# Patient Record
Sex: MALE | Race: WHITE | Employment: FULL TIME | URBAN - METROPOLITAN AREA
[De-identification: names, ages, dates, MRNs, and addresses within clinical notes are randomized per-mention and may not be internally consistent; named-entity substitution may affect disease eponyms.]

---

## 2020-10-12 ENCOUNTER — APPOINTMENT (OUTPATIENT)
Dept: GENERAL RADIOLOGY | Age: 30
End: 2020-10-12
Payer: COMMERCIAL

## 2020-10-12 ENCOUNTER — APPOINTMENT (OUTPATIENT)
Dept: CT IMAGING | Age: 30
End: 2020-10-12
Payer: COMMERCIAL

## 2020-10-12 ENCOUNTER — HOSPITAL ENCOUNTER (EMERGENCY)
Age: 30
Discharge: HOME | End: 2020-10-13
Payer: COMMERCIAL

## 2020-10-12 PROCEDURE — 73090 X-RAY EXAM OF FOREARM: CPT

## 2020-10-12 PROCEDURE — 73030 X-RAY EXAM OF SHOULDER: CPT

## 2020-10-12 PROCEDURE — 73080 X-RAY EXAM OF ELBOW: CPT

## 2020-10-12 PROCEDURE — 6370000000 HC RX 637 (ALT 250 FOR IP): Performed by: PHYSICIAN ASSISTANT

## 2020-10-12 PROCEDURE — 99284 EMERGENCY DEPT VISIT MOD MDM: CPT

## 2020-10-12 PROCEDURE — 70450 CT HEAD/BRAIN W/O DYE: CPT

## 2020-10-12 PROCEDURE — 72125 CT NECK SPINE W/O DYE: CPT

## 2020-10-12 RX ORDER — ACETAMINOPHEN 500 MG
1000 TABLET ORAL ONCE
Status: COMPLETED | OUTPATIENT
Start: 2020-10-12 | End: 2020-10-12

## 2020-10-12 RX ORDER — LISINOPRIL 20 MG/1
20 TABLET ORAL DAILY
COMMUNITY

## 2020-10-12 RX ADMIN — ACETAMINOPHEN 1000 MG: 500 TABLET ORAL at 23:01

## 2020-10-12 ASSESSMENT — PAIN SCALES - GENERAL
PAINLEVEL_OUTOF10: 7

## 2020-10-12 ASSESSMENT — PAIN DESCRIPTION - ORIENTATION: ORIENTATION: RIGHT

## 2020-10-12 ASSESSMENT — PAIN DESCRIPTION - PAIN TYPE
TYPE: ACUTE PAIN
TYPE: ACUTE PAIN

## 2020-10-12 ASSESSMENT — PAIN DESCRIPTION - LOCATION: LOCATION: ARM

## 2020-10-13 VITALS
HEIGHT: 67 IN | DIASTOLIC BLOOD PRESSURE: 96 MMHG | OXYGEN SATURATION: 97 % | TEMPERATURE: 97.9 F | BODY MASS INDEX: 39.24 KG/M2 | SYSTOLIC BLOOD PRESSURE: 134 MMHG | RESPIRATION RATE: 20 BRPM | WEIGHT: 250 LBS | HEART RATE: 84 BPM

## 2020-10-13 RX ORDER — ACETAMINOPHEN 500 MG
1000 TABLET ORAL EVERY 6 HOURS PRN
Qty: 40 TABLET | Refills: 0 | Status: SHIPPED | OUTPATIENT
Start: 2020-10-13 | End: 2020-10-18

## 2020-10-13 RX ORDER — IBUPROFEN 400 MG/1
400 TABLET ORAL EVERY 6 HOURS PRN
Qty: 20 TABLET | Refills: 0 | Status: SHIPPED | OUTPATIENT
Start: 2020-10-13

## 2020-10-13 NOTE — ED NOTES
Patient identified as a positive fall risk on the ED triage fall screening. Patient placed in fall precautions which includes:  yellow fall risk bracelet on wrist, personal non skid shoes on feet, \"Be Safe\" sign placed on patient's door, and bed alarm placed under patient/alarm turned on. Patient instructed on importance of not getting out of bed or ambulating without assistance for safety.           Moe Aguayo RN  10/12/20 2627

## 2020-10-13 NOTE — ED PROVIDER NOTES
[address] [facility]  ED  EMERGENCY DEPARTMENT ENCOUNTER        Pt Name: Quita Sanford  MRN: [id number]  [place] 1990  Date of evaluation: 10/12/2020  Provider: ESTER Ceballos  PCP: No primary care provider on file. JETT. I have evaluated this patient. My supervising physician was available for consultation. CHIEF COMPLAINT       Chief Complaint   Patient presents with    Arm Pain     Patient fell down approximately 6 steps today from slipping on wet leaves and landed on right arm. Pain in right arm. Patient states he had no LOC but his head on the right side. HISTORY OF PRESENT ILLNESS   (Location, Timing/Onset, Context/Setting, Quality, Duration, Modifying Factors, Severity, Associated Signs and Symptoms)  Note limiting factors. Quita Sanford is a 26 y.o. male presents the emergency department for right arm pain. Patient reports that he was walking down very slippery steps in the rain, fell down 6 steps landing on his right elbow and upper arm. Notes that he hit his head on the ground but denies loss of conscious. Notes a mild headache. Notes a tingling sensation in his elbow into his fingers. Denies numbness, weakness, vision changes, nausea, vomiting, chest pain, abdominal pain, back pain, lower extremity pain. Nursing Notes were all reviewed and agreed with or any disagreements were addressed in the HPI. REVIEW OF SYSTEMS    (2-9 systems for level 4, 10 or more for level 5)     Review of Systems    Positives and Pertinent negatives as per HPI. Except as noted above in the ROS, all other systems were reviewed and negative. PAST MEDICAL HISTORY     Past Medical History:   Diagnosis Date    Asthma     Hypertension          SURGICAL HISTORY   History reviewed. No pertinent surgical history.       Saige Emery 95       Discharge Medication List as of 10/13/2020 12:05 AM      CONTINUE these medications which have NOT CHANGED    Details lisinopril (PRINIVIL;ZESTRIL) 20 MG tablet Take 20 mg by mouth dailyHistorical Med      oxyCODONE-acetaminophen (PERCOCET) 5-325 MG per tablet Take 1 tablet by mouth every 8 hours as needed for Pain., Disp-15 tablet, R-0               ALLERGIES     Patient has no known allergies. FAMILYHISTORY       Family History   Problem Relation Age of Onset    Asthma Father     Heart Disease Father     High Blood Pressure Father     High Cholesterol Father     Asthma Brother           SOCIAL HISTORY       Social History     Tobacco Use    Smoking status: Current Every Day Smoker     Packs/day: 1.00     Types: Cigarettes    Smokeless tobacco: Never Used   Substance Use Topics    Alcohol use: No    Drug use: Not on file       SCREENINGS    Marcelina Coma Scale  Eye Opening: Spontaneous  Best Verbal Response: Oriented  Best Motor Response: Obeys commands  Frazee Coma Scale Score: 15        PHYSICAL EXAM    (up to 7 for level 4, 8 or more for level 5)     ED Triage Vitals [10/12/20 2227]   BP Temp Temp Source Pulse Resp SpO2 Height Weight   (!) 155/92 98.6 °F (37 °C) Oral 100 16 95 % 5' 7\" (1.702 m) 250 lb (113.4 kg)       Physical Exam  Vitals signs reviewed. Constitutional:       Appearance: He is not diaphoretic. HENT:      Head: Normocephalic and atraumatic. Nose: No congestion or rhinorrhea. Eyes:      General: No scleral icterus. Extraocular Movements: Extraocular movements intact. Conjunctiva/sclera: Conjunctivae normal.      Pupils: Pupils are equal, round, and reactive to light. Neck:      Musculoskeletal: Normal range of motion and neck supple. Muscular tenderness present. No neck rigidity. Cardiovascular:      Rate and Rhythm: Normal rate and regular rhythm. Pulses: Normal pulses. Heart sounds: Normal heart sounds. No murmur. No friction rub. No gallop. Pulmonary:      Effort: Pulmonary effort is normal. No respiratory distress. Breath sounds: Normal breath sounds. fracture. CT Head WO Contrast   Final Result   Negative noncontrast CT examination of the brain. CT Cervical Spine WO Contrast   Final Result   No acute abnormality of the cervical spine. Xr Shoulder Right (min 2 Views)    Result Date: 10/12/2020  EXAMINATION: THREE XRAY VIEWS OF THE RIGHT SHOULDER; THREE XRAY VIEWS OF THE RIGHT ELBOW; TWO XRAY VIEWS OF THE RIGHT FOREARM 10/12/2020 11:16 pm COMPARISON: None. HISTORY: ORDERING SYSTEM PROVIDED HISTORY: Fall TECHNOLOGIST PROVIDED HISTORY: Reason for exam:->Fall Reason for Exam: Slipped on some leaves and fell, landed on right side FINDINGS: Right shoulder: No acute fracture or dislocation is identified. Right elbow: No acute fracture or dislocation or effusion is identified. Right forearm: No fracture or dislocation is identified. No radiographic evidence of fracture. Xr Elbow Right (min 3 Views)    Result Date: 10/12/2020  EXAMINATION: THREE XRAY VIEWS OF THE RIGHT SHOULDER; THREE XRAY VIEWS OF THE RIGHT ELBOW; TWO XRAY VIEWS OF THE RIGHT FOREARM 10/12/2020 11:16 pm COMPARISON: None. HISTORY: ORDERING SYSTEM PROVIDED HISTORY: Fall TECHNOLOGIST PROVIDED HISTORY: Reason for exam:->Fall Reason for Exam: Slipped on some leaves and fell, landed on right side FINDINGS: Right shoulder: No acute fracture or dislocation is identified. Right elbow: No acute fracture or dislocation or effusion is identified. Right forearm: No fracture or dislocation is identified. No radiographic evidence of fracture. Xr Radius Ulna Right (2 Views)    Result Date: 10/12/2020  EXAMINATION: THREE XRAY VIEWS OF THE RIGHT SHOULDER; THREE XRAY VIEWS OF THE RIGHT ELBOW; TWO XRAY VIEWS OF THE RIGHT FOREARM 10/12/2020 11:16 pm COMPARISON: None.  HISTORY: ORDERING SYSTEM PROVIDED HISTORY: Fall TECHNOLOGIST PROVIDED HISTORY: Reason for exam:->Fall Reason for Exam: Slipped on some leaves and fell, landed on right side FINDINGS: Right shoulder: No acute fracture or head, no LOC FINDINGS: BONES/ALIGNMENT: There is no acute fracture or traumatic malalignment. DEGENERATIVE CHANGES: There is prominent posterior endplate spurring at E2-M6. SOFT TISSUES: There is no prevertebral soft tissue swelling. No acute abnormality of the cervical spine. PROCEDURES   Unless otherwise noted below, none     Procedures    CRITICAL CARE TIME   N/A    CONSULTS:  None      EMERGENCY DEPARTMENT COURSE and DIFFERENTIAL DIAGNOSIS/MDM:   Vitals:    Vitals:    10/12/20 2227 10/12/20 2332 10/13/20 0024   BP: (!) 155/92 134/77 (!) 134/96   Pulse: 100 98 84   Resp: 16 17 20   Temp: 98.6 °F (37 °C)  97.9 °F (36.6 °C)   TempSrc: Oral  Oral   SpO2: 95% 98% 97%   Weight: 250 lb (113.4 kg)     Height: 5' 7\" (1.702 m)         Patient was given the following medications:  Medications   acetaminophen (TYLENOL) tablet 1,000 mg (1,000 mg Oral Given 10/12/20 2301)           42-year-old male presents emergency room from home for right arm pain after fall. Due to his tingling sensation of his arm, I did elect to order CT scan of his head, cervical spine and x-rays of his right shoulder, right elbow, and right forearm. Imaging studies without evidence of fracture, intracranial bleeding, cervical fracture or dislocation. Neurovascular intact, low concern for emergent injury at this time. Appropriate discharge with outpatient follow-up. Given prescription for Tylenol, ibuprofen. Given information for [facility] pre-services, recommend to call tomorrow to help to schedule primary care follow-up, ideally to be seen within 1 week. Instructed to return the emergency room for new or worsening symptoms including but not limited to numbness, weakness, severe headache, vision change, severe nausea or vomiting, chest pain, shortness of breath, any other symptoms he is concerned about. Verbal and written discharge instructions and return precautions given. FINAL IMPRESSION      1. Right arm pain    2.

## 2020-10-13 NOTE — ED NOTES
Patient being taken for xray at this time via stretcher.         Belkis Delgadillo RN  10/12/20 7203

## 2021-01-23 ENCOUNTER — APPOINTMENT (OUTPATIENT)
Dept: GENERAL RADIOLOGY | Age: 31
DRG: 872 | End: 2021-01-23
Payer: COMMERCIAL

## 2021-01-23 ENCOUNTER — HOSPITAL ENCOUNTER (INPATIENT)
Age: 31
LOS: 1 days | Discharge: HOME | DRG: 872 | End: 2021-01-25
Attending: EMERGENCY MEDICINE | Admitting: INTERNAL MEDICINE
Payer: COMMERCIAL

## 2021-01-23 DIAGNOSIS — A41.9 SEPTICEMIA (HCC): ICD-10-CM

## 2021-01-23 DIAGNOSIS — L03.116 CELLULITIS OF LEFT LOWER EXTREMITY: ICD-10-CM

## 2021-01-23 DIAGNOSIS — E87.1 HYPONATREMIA: Primary | ICD-10-CM

## 2021-01-23 LAB
A/G RATIO: 1.5 (ref 1.1–2.2)
ALBUMIN SERPL-MCNC: 4.1 G/DL (ref 3.4–5)
ALP BLD-CCNC: 67 U/L (ref 40–129)
ALT SERPL-CCNC: 29 U/L (ref 10–40)
ANION GAP SERPL CALCULATED.3IONS-SCNC: 8 MMOL/L (ref 3–16)
AST SERPL-CCNC: 21 U/L (ref 15–37)
BILIRUB SERPL-MCNC: 0.5 MG/DL (ref 0–1)
BUN BLDV-MCNC: 7 MG/DL (ref 7–20)
CALCIUM SERPL-MCNC: 9.1 MG/DL (ref 8.3–10.6)
CHLORIDE BLD-SCNC: 97 MMOL/L (ref 99–110)
CO2: 23 MMOL/L (ref 21–32)
CREAT SERPL-MCNC: 0.8 MG/DL (ref 0.9–1.3)
GFR AFRICAN AMERICAN: >60
GFR NON-AFRICAN AMERICAN: >60
GLOBULIN: 2.7 G/DL
GLUCOSE BLD-MCNC: 178 MG/DL (ref 70–99)
INR BLD: 1.05 (ref 0.86–1.14)
LACTIC ACID: 1.3 MMOL/L (ref 0.4–2)
POTASSIUM SERPL-SCNC: 3.5 MMOL/L (ref 3.5–5.1)
PROTHROMBIN TIME: 12.2 SEC (ref 10–13.2)
SODIUM BLD-SCNC: 128 MMOL/L (ref 136–145)
TOTAL PROTEIN: 6.8 G/DL (ref 6.4–8.2)

## 2021-01-23 PROCEDURE — 93005 ELECTROCARDIOGRAM TRACING: CPT | Performed by: PHYSICIAN ASSISTANT

## 2021-01-23 PROCEDURE — 6360000002 HC RX W HCPCS: Performed by: PHYSICIAN ASSISTANT

## 2021-01-23 PROCEDURE — 6370000000 HC RX 637 (ALT 250 FOR IP): Performed by: PHYSICIAN ASSISTANT

## 2021-01-23 PROCEDURE — 84145 PROCALCITONIN (PCT): CPT

## 2021-01-23 PROCEDURE — 99285 EMERGENCY DEPT VISIT HI MDM: CPT

## 2021-01-23 PROCEDURE — 83605 ASSAY OF LACTIC ACID: CPT

## 2021-01-23 PROCEDURE — 80053 COMPREHEN METABOLIC PANEL: CPT

## 2021-01-23 PROCEDURE — U0002 COVID-19 LAB TEST NON-CDC: HCPCS

## 2021-01-23 PROCEDURE — 87040 BLOOD CULTURE FOR BACTERIA: CPT

## 2021-01-23 PROCEDURE — 2580000003 HC RX 258: Performed by: PHYSICIAN ASSISTANT

## 2021-01-23 PROCEDURE — 81001 URINALYSIS AUTO W/SCOPE: CPT

## 2021-01-23 PROCEDURE — 36415 COLL VENOUS BLD VENIPUNCTURE: CPT

## 2021-01-23 PROCEDURE — 71045 X-RAY EXAM CHEST 1 VIEW: CPT

## 2021-01-23 PROCEDURE — 96365 THER/PROPH/DIAG IV INF INIT: CPT

## 2021-01-23 PROCEDURE — 85610 PROTHROMBIN TIME: CPT

## 2021-01-23 PROCEDURE — 85025 COMPLETE CBC W/AUTO DIFF WBC: CPT

## 2021-01-23 RX ORDER — ACETAMINOPHEN 500 MG
1000 TABLET ORAL ONCE
Status: COMPLETED | OUTPATIENT
Start: 2021-01-23 | End: 2021-01-23

## 2021-01-23 RX ORDER — SODIUM CHLORIDE, SODIUM LACTATE, POTASSIUM CHLORIDE, CALCIUM CHLORIDE 600; 310; 30; 20 MG/100ML; MG/100ML; MG/100ML; MG/100ML
30 INJECTION, SOLUTION INTRAVENOUS ONCE
Status: COMPLETED | OUTPATIENT
Start: 2021-01-23 | End: 2021-01-24

## 2021-01-23 RX ORDER — SODIUM CHLORIDE, SODIUM LACTATE, POTASSIUM CHLORIDE, CALCIUM CHLORIDE 600; 310; 30; 20 MG/100ML; MG/100ML; MG/100ML; MG/100ML
1000 INJECTION, SOLUTION INTRAVENOUS ONCE
Status: DISCONTINUED | OUTPATIENT
Start: 2021-01-23 | End: 2021-01-23

## 2021-01-23 RX ADMIN — SODIUM CHLORIDE, POTASSIUM CHLORIDE, SODIUM LACTATE AND CALCIUM CHLORIDE 1914 ML: 600; 310; 30; 20 INJECTION, SOLUTION INTRAVENOUS at 23:17

## 2021-01-23 RX ADMIN — PIPERACILLIN SODIUM AND TAZOBACTAM SODIUM 4500 MG: 4; .5 INJECTION, POWDER, LYOPHILIZED, FOR SOLUTION INTRAVENOUS at 23:30

## 2021-01-23 RX ADMIN — ACETAMINOPHEN 1000 MG: 500 TABLET ORAL at 23:17

## 2021-01-23 ASSESSMENT — PAIN SCALES - GENERAL: PAINLEVEL_OUTOF10: 7

## 2021-01-23 ASSESSMENT — PAIN DESCRIPTION - PAIN TYPE: TYPE: ACUTE PAIN

## 2021-01-24 ENCOUNTER — APPOINTMENT (OUTPATIENT)
Dept: CT IMAGING | Age: 31
DRG: 872 | End: 2021-01-24
Payer: COMMERCIAL

## 2021-01-24 PROBLEM — L03.90 CELLULITIS: Status: ACTIVE | Noted: 2021-01-24

## 2021-01-24 LAB
BACTERIA: ABNORMAL /HPF
BANDED NEUTROPHILS RELATIVE PERCENT: 10 % (ref 0–7)
BASOPHILS ABSOLUTE: 0 K/UL (ref 0–0.2)
BASOPHILS RELATIVE PERCENT: 0 %
BILIRUBIN URINE: NEGATIVE
BLOOD, URINE: ABNORMAL
CLARITY: CLEAR
COLOR: YELLOW
EKG ATRIAL RATE: 105 BPM
EKG DIAGNOSIS: NORMAL
EKG P AXIS: 42 DEGREES
EKG P-R INTERVAL: 140 MS
EKG Q-T INTERVAL: 314 MS
EKG QRS DURATION: 94 MS
EKG QTC CALCULATION (BAZETT): 415 MS
EKG R AXIS: -6 DEGREES
EKG T AXIS: 30 DEGREES
EKG VENTRICULAR RATE: 105 BPM
EOSINOPHILS ABSOLUTE: 0.2 K/UL (ref 0–0.6)
EOSINOPHILS RELATIVE PERCENT: 1 %
EPITHELIAL CELLS, UA: ABNORMAL /HPF (ref 0–5)
GLUCOSE URINE: NEGATIVE MG/DL
HCT VFR BLD CALC: 38.2 % (ref 40.5–52.5)
HEMOGLOBIN: 12.9 G/DL (ref 13.5–17.5)
KETONES, URINE: NEGATIVE MG/DL
LEUKOCYTE ESTERASE, URINE: NEGATIVE
LYMPHOCYTES ABSOLUTE: 1.3 K/UL (ref 1–5.1)
LYMPHOCYTES RELATIVE PERCENT: 8 %
MCH RBC QN AUTO: 28.7 PG (ref 26–34)
MCHC RBC AUTO-ENTMCNC: 33.8 G/DL (ref 31–36)
MCV RBC AUTO: 85 FL (ref 80–100)
MICROSCOPIC EXAMINATION: YES
MONOCYTES ABSOLUTE: 1.5 K/UL (ref 0–1.3)
MONOCYTES RELATIVE PERCENT: 9 %
MUCUS: ABNORMAL /LPF
NEUTROPHILS ABSOLUTE: 13.6 K/UL (ref 1.7–7.7)
NEUTROPHILS RELATIVE PERCENT: 72 %
NITRITE, URINE: NEGATIVE
PDW BLD-RTO: 13.1 % (ref 12.4–15.4)
PH UA: 6.5 (ref 5–8)
PLATELET # BLD: 266 K/UL (ref 135–450)
PMV BLD AUTO: 7.5 FL (ref 5–10.5)
PROCALCITONIN: 0.08 NG/ML (ref 0–0.15)
PROTEIN UA: ABNORMAL MG/DL
RBC # BLD: 4.5 M/UL (ref 4.2–5.9)
RBC # BLD: NORMAL 10*6/UL
RBC UA: ABNORMAL /HPF (ref 0–4)
SARS-COV-2, NAAT: NOT DETECTED
SPECIFIC GRAVITY UA: 1.02 (ref 1–1.03)
URINE REFLEX TO CULTURE: ABNORMAL
URINE TYPE: ABNORMAL
UROBILINOGEN, URINE: 1 E.U./DL
WBC # BLD: 16.6 K/UL (ref 4–11)
WBC UA: ABNORMAL /HPF (ref 0–5)

## 2021-01-24 PROCEDURE — 6370000000 HC RX 637 (ALT 250 FOR IP): Performed by: INTERNAL MEDICINE

## 2021-01-24 PROCEDURE — 6360000002 HC RX W HCPCS: Performed by: INTERNAL MEDICINE

## 2021-01-24 PROCEDURE — 2580000003 HC RX 258: Performed by: INTERNAL MEDICINE

## 2021-01-24 PROCEDURE — 6360000004 HC RX CONTRAST MEDICATION: Performed by: PHYSICIAN ASSISTANT

## 2021-01-24 PROCEDURE — 93010 ELECTROCARDIOGRAM REPORT: CPT | Performed by: INTERNAL MEDICINE

## 2021-01-24 PROCEDURE — 99253 IP/OBS CNSLTJ NEW/EST LOW 45: CPT | Performed by: SURGERY

## 2021-01-24 PROCEDURE — 2580000003 HC RX 258: Performed by: PHYSICIAN ASSISTANT

## 2021-01-24 PROCEDURE — 96367 TX/PROPH/DG ADDL SEQ IV INF: CPT

## 2021-01-24 PROCEDURE — 74177 CT ABD & PELVIS W/CONTRAST: CPT

## 2021-01-24 PROCEDURE — 2060000000 HC ICU INTERMEDIATE R&B

## 2021-01-24 PROCEDURE — 6360000002 HC RX W HCPCS: Performed by: PHYSICIAN ASSISTANT

## 2021-01-24 RX ORDER — SODIUM CHLORIDE 9 MG/ML
1000 INJECTION, SOLUTION INTRAVENOUS ONCE
Status: COMPLETED | OUTPATIENT
Start: 2021-01-24 | End: 2021-01-24

## 2021-01-24 RX ORDER — SODIUM CHLORIDE 0.9 % (FLUSH) 0.9 %
10 SYRINGE (ML) INJECTION PRN
Status: DISCONTINUED | OUTPATIENT
Start: 2021-01-24 | End: 2021-01-25 | Stop reason: HOSPADM

## 2021-01-24 RX ORDER — LISINOPRIL 20 MG/1
20 TABLET ORAL DAILY
Status: DISCONTINUED | OUTPATIENT
Start: 2021-01-24 | End: 2021-01-25 | Stop reason: HOSPADM

## 2021-01-24 RX ORDER — ONDANSETRON 2 MG/ML
4 INJECTION INTRAMUSCULAR; INTRAVENOUS EVERY 6 HOURS PRN
Status: DISCONTINUED | OUTPATIENT
Start: 2021-01-24 | End: 2021-01-25 | Stop reason: HOSPADM

## 2021-01-24 RX ORDER — PROMETHAZINE HYDROCHLORIDE 25 MG/1
12.5 TABLET ORAL EVERY 6 HOURS PRN
Status: DISCONTINUED | OUTPATIENT
Start: 2021-01-24 | End: 2021-01-25 | Stop reason: HOSPADM

## 2021-01-24 RX ORDER — ACETAMINOPHEN 325 MG/1
650 TABLET ORAL EVERY 6 HOURS PRN
Status: DISCONTINUED | OUTPATIENT
Start: 2021-01-24 | End: 2021-01-25 | Stop reason: HOSPADM

## 2021-01-24 RX ORDER — MAGNESIUM SULFATE IN WATER 40 MG/ML
2000 INJECTION, SOLUTION INTRAVENOUS PRN
Status: DISCONTINUED | OUTPATIENT
Start: 2021-01-24 | End: 2021-01-25

## 2021-01-24 RX ORDER — POTASSIUM CHLORIDE 7.45 MG/ML
10 INJECTION INTRAVENOUS PRN
Status: DISCONTINUED | OUTPATIENT
Start: 2021-01-24 | End: 2021-01-25

## 2021-01-24 RX ORDER — FAMOTIDINE 20 MG/1
20 TABLET, FILM COATED ORAL DAILY PRN
Status: DISCONTINUED | OUTPATIENT
Start: 2021-01-24 | End: 2021-01-25 | Stop reason: HOSPADM

## 2021-01-24 RX ORDER — SODIUM CHLORIDE 0.9 % (FLUSH) 0.9 %
10 SYRINGE (ML) INJECTION EVERY 12 HOURS SCHEDULED
Status: DISCONTINUED | OUTPATIENT
Start: 2021-01-24 | End: 2021-01-25 | Stop reason: HOSPADM

## 2021-01-24 RX ORDER — ACETAMINOPHEN 650 MG/1
650 SUPPOSITORY RECTAL EVERY 6 HOURS PRN
Status: DISCONTINUED | OUTPATIENT
Start: 2021-01-24 | End: 2021-01-25 | Stop reason: HOSPADM

## 2021-01-24 RX ADMIN — ACETAMINOPHEN 650 MG: 325 TABLET ORAL at 20:34

## 2021-01-24 RX ADMIN — Medication 10 ML: at 20:34

## 2021-01-24 RX ADMIN — Medication 1500 MG: at 12:08

## 2021-01-24 RX ADMIN — SODIUM CHLORIDE 1000 ML: 9 INJECTION, SOLUTION INTRAVENOUS at 04:08

## 2021-01-24 RX ADMIN — CEFTRIAXONE SODIUM 2000 MG: 2 INJECTION, POWDER, FOR SOLUTION INTRAMUSCULAR; INTRAVENOUS at 08:48

## 2021-01-24 RX ADMIN — LISINOPRIL 20 MG: 20 TABLET ORAL at 10:27

## 2021-01-24 RX ADMIN — Medication 10 ML: at 08:50

## 2021-01-24 RX ADMIN — VANCOMYCIN HYDROCHLORIDE 2000 MG: 1 INJECTION, POWDER, LYOPHILIZED, FOR SOLUTION INTRAVENOUS at 00:05

## 2021-01-24 RX ADMIN — IOPAMIDOL 75 ML: 755 INJECTION, SOLUTION INTRAVENOUS at 00:41

## 2021-01-24 RX ADMIN — ACETAMINOPHEN 650 MG: 325 TABLET ORAL at 09:38

## 2021-01-24 ASSESSMENT — PAIN SCALES - GENERAL
PAINLEVEL_OUTOF10: 3
PAINLEVEL_OUTOF10: 0

## 2021-01-24 ASSESSMENT — PAIN DESCRIPTION - LOCATION
LOCATION: LEG
LOCATION: LEG

## 2021-01-24 ASSESSMENT — PAIN DESCRIPTION - PAIN TYPE: TYPE: ACUTE PAIN

## 2021-01-24 ASSESSMENT — PAIN DESCRIPTION - ORIENTATION
ORIENTATION: MID
ORIENTATION: MID

## 2021-01-24 NOTE — CONSULTS
Department of General Surgery Consult    PATIENT NAME: Gloria Joseph   YOB: 1990    ADMISSION DATE: 1/23/2021 10:34 PM      TODAY'S DATE: 1/24/2021    Reason for Consult: Left groin abscess    Chief Complaint: Left groin abscess    Requesting Physician: Bouchra    HISTORY OF PRESENT ILLNESS:              The patient is a 26 y.o. male who presents with left groin abscess. He states this actually started a couple weeks ago. It got progressively worse over the course of the past day or 2. Yesterday, he stated he took warm baths to try to get it to settle down. At that point, after getting out of the bath, it ruptured and drained. He subsequently developed fevers and chills and his blood pressure went up. He states he feels better today, but it is still sore. Past Medical History:        Diagnosis Date    Asthma     Hyperlipidemia     Hypertension        Past Surgical History:    History reviewed. No pertinent surgical history.     Current Medications:   Current Facility-Administered Medications: sodium chloride flush 0.9 % injection 10 mL, 10 mL, Intravenous, 2 times per day  sodium chloride flush 0.9 % injection 10 mL, 10 mL, Intravenous, PRN  potassium chloride 10 mEq/100 mL IVPB (Peripheral Line), 10 mEq, Intravenous, PRN  magnesium sulfate 2000 mg in 50 mL IVPB premix, 2,000 mg, Intravenous, PRN  promethazine (PHENERGAN) tablet 12.5 mg, 12.5 mg, Oral, Q6H PRN **OR** ondansetron (ZOFRAN) injection 4 mg, 4 mg, Intravenous, Q6H PRN  famotidine (PEPCID) tablet 20 mg, 20 mg, Oral, Daily PRN  acetaminophen (TYLENOL) tablet 650 mg, 650 mg, Oral, Q6H PRN **OR** acetaminophen (TYLENOL) suppository 650 mg, 650 mg, Rectal, Q6H PRN  cefTRIAXone (ROCEPHIN) 2000 mg IVPB in D5W 50ml minibag, 2,000 mg, Intravenous, Q24H  vancomycin 1500 mg in dextrose 5% 300 mL IVPB, 1,500 mg, Intravenous, Q12H  Prior to Admission medications Medication Sig Start Date End Date Taking? Authorizing Provider   ibuprofen (ADVIL;MOTRIN) 400 MG tablet Take 1 tablet by mouth every 6 hours as needed for Pain 10/13/20  Yes ESTER Ramírez   lisinopril (PRINIVIL;ZESTRIL) 20 MG tablet Take 20 mg by mouth daily   Yes Historical Provider, MD   acetaminophen (TYLENOL) 500 MG tablet Take 2 tablets by mouth every 6 hours as needed for Pain 10/13/20 10/18/20  ESTER Ramírez   oxyCODONE-acetaminophen (PERCOCET) 5-325 MG per tablet Take 1 tablet by mouth every 8 hours as needed for Pain. 6/23/14   ESTER Prieto        Allergies:  Patient has no known allergies. Social History:   TOBACCO:   reports that he has been smoking cigarettes. He has been smoking about 1.00 pack per day. He has never used smokeless tobacco.  ETOH:   reports no history of alcohol use. DRUGS:   reports previous drug use. Family History:        Problem Relation Age of Onset    Asthma Father     Heart Disease Father     High Blood Pressure Father     High Cholesterol Father     Asthma Brother        REVIEW OF SYSTEMS:  CONSTITUTIONAL:  positive for  fevers and chills  HEENT:  negative  RESPIRATORY:  negative  CARDIOVASCULAR:  negative  GASTROINTESTINAL:  negative  GENITOURINARY:  negative  HEMATOLOGIC/LYMPHATIC:  negative  NEUROLOGICAL:  Negative  * All other ROS reviewed and negative. PHYSICAL EXAM:  VITALS:  /81   Pulse 102   Temp 98.7 °F (37.1 °C) (Oral)   Resp 16   Ht 5' 7\" (1.702 m)   Wt 264 lb 8 oz (120 kg)   SpO2 98%   BMI 41.43 kg/m²   24HR INTAKE/OUTPUT:    No intake/output data recorded.   I/O this shift:  In: -   Out: 800 [Urine:800]    CONSTITUTIONAL:  alert, no apparent distress and morbidly obese  EYES:  PERRL, sclera clear  ENT:  Normocephalic,atraumatic, without obvious abnormality  NECK:  supple, symmetrical, trachea midline  LUNGS: Resp effort easy and unlabored, clear to auscultation CARDIOVASCULAR:  NO JVD, tachycardic with regular rhythm and no murmur noted  ABDOMEN:  normal bowel sounds, soft, non-distended, non-tender  MUSCULOSKELETAL: No clubbing or cyanosis, 0+ pitting edema lower extremities  NEUROLOGIC:  Mental Status Exam:  Level of Alertness:   awake  PSYCHIATRIC:   person, place, time  SKIN: On his left medial thigh, there is an area of induration with mild cellulitis. There is an opening over the prior abscess cavity that is draining some viscous bloody fluid. There is no obvious persistent fluid collection on examination and probing of the wound    DATA:    CBC:   Recent Labs     01/23/21 2230   WBC 16.6*   HGB 12.9*   HCT 38.2*        BMP:    Recent Labs     01/23/21 2230   *   K 3.5   CL 97*   CO2 23   BUN 7   CREATININE 0.8*   GLUCOSE 178*     Hepatic:   Recent Labs     01/23/21 2230   AST 21   ALT 29   BILITOT 0.5   ALKPHOS 79     Mag:    No results for input(s): MG in the last 72 hours. Phos:   No results for input(s): PHOS in the last 72 hours. INR:   Recent Labs     01/23/21  2250   INR 1.05       Radiology Review: Images personally reviewed by me. Left inguinal fat stranding extending into the medial left upper thigh with   no evidence of a localized fluid collection/abscess.  Findings are consistent   with cellulitis. Jacqui Zbigniew is no soft tissue gas to suggest necrotizing   fasciitis. No acute finding in the abdomen or pelvis.  Fatty infiltration of the liver. Small bilateral fat containing inguinal hernias. IMPRESSION/RECOMMENDATIONS:    Left medial thigh abscess with cellulitis. The abscess has apparently drained spontaneously. No indication at this time for surgical intervention. Continue IV antibiotics and supportive care.     Electronically signed by Ivonne Brody MD     [address] [facility]  [id number]

## 2021-01-24 NOTE — ED NOTES
Bed: 11  Expected date: 1/23/21  Expected time:   Means of arrival:   Comments:  Med [address] [place] [address]  01/23/21 0850

## 2021-01-24 NOTE — PROGRESS NOTES
Patient admitted to room 217 from ED. Tele box 115 in place cmu notified. Patient oriented to room, call light, bed rails, phone, lights and bathroom. Patient instructed about the schedule of the day including: vital sign frequency, lab draws, possible tests, frequency of MD and staff rounds, including RN/MD rounding together at bedside, daily weights, and I &O's. Patient instructed about prescribed diet, how to use 8MENU, and television. Bed locked, in lowest position, side rails up 2/4, call light within reach. Will continue to monitor. Disaster documentation initiated at this time.

## 2021-01-24 NOTE — H&P
Hospital Medicine History & Physical      PCP: No primary care provider on file. Date of Admission: 1/23/2021    Date of Service: Pt seen/examined on 1/24/21 and Admitted to Inpatient with expected LOS greater than two midnights due to medical therapy. Chief Complaint:  Groin cyst      History Of Present Illness:      26 y.o. male who presented to [facility] with developing upper left thigh pain and cyst. Pt noted lower abd pain 1.5 weeks ago but in the past 3 days, had dramatically worsened. He noted cyst on inner left thigh and popped open yesterday, with blood drainage. He then noted worsening swelling along with fever of 101 and chills. His bp was 150s/80s at home with 120 pulse rate so came in for evaluation. Past Medical History:          Diagnosis Date    Asthma     Hyperlipidemia     Hypertension        Past Surgical History:      History reviewed. No pertinent surgical history. Medications Prior to Admission:      Prior to Admission medications    Medication Sig Start Date End Date Taking? Authorizing Provider   ibuprofen (ADVIL;MOTRIN) 400 MG tablet Take 1 tablet by mouth every 6 hours as needed for Pain 10/13/20  Yes ESTER Valentino   lisinopril (PRINIVIL;ZESTRIL) 20 MG tablet Take 20 mg by mouth daily   Yes Historical Provider, MD   acetaminophen (TYLENOL) 500 MG tablet Take 2 tablets by mouth every 6 hours as needed for Pain 10/13/20 10/18/20  ESTER Valentino   oxyCODONE-acetaminophen (PERCOCET) 5-325 MG per tablet Take 1 tablet by mouth every 8 hours as needed for Pain. 6/23/14   ESTER Gutierrez       Allergies:  Patient has no known allergies. Social History:      The patient currently lives at home    TOBACCO:   reports that he has been smoking cigarettes. He has been smoking about 1.00 pack per day. He has never used smokeless tobacco.  ETOH:   reports no history of alcohol use.   E-Cigarettes/Vaping Use     Questions Responses E-Cigarette/Vaping Use     Start Date     Passive Exposure     Quit Date     Counseling Given     Comments             Family History:       Reviewed in detail and negative for DM, CAD, Cancer, CVA. Positive as follows:        Problem Relation Age of Onset    Asthma Father     Heart Disease Father     High Blood Pressure Father     High Cholesterol Father     Asthma Brother        REVIEW OF SYSTEMS:   Pertinent positives as noted in the HPI. All other systems reviewed and negative. PHYSICAL EXAM PERFORMED:    /81   Pulse 102   Temp 98.7 °F (37.1 °C) (Oral)   Resp 16   Ht 5' 7\" (1.702 m)   Wt 264 lb 8 oz (120 kg)   SpO2 98%   BMI 41.43 kg/m²     General appearance:  No apparent distress, appears stated age and cooperative. HEENT:  Normal cephalic, atraumatic without obvious deformity. Pupils equal, round, and reactive to light. Extra ocular muscles intact. Conjunctivae/corneas clear. Neck: Supple, with full range of motion. No jugular venous distention. Trachea midline. Respiratory:  Normal respiratory effort. Clear to auscultation, bilaterally without Rales/Wheezes/Rhonchi. Cardiovascular:  Regular rate and rhythm with normal S1/S2 without murmurs, rubs or gallops. Abdomen: Soft, non-tender, non-distended with normal bowel sounds. Musculoskeletal:  No clubbing, cyanosis or edema bilaterally. Full range of motion without deformity. Skin: Skin color, texture, turgor normal.  No rashes or lesions. Demarcated area of erythema and induration on inner upper medial left thigh with dressing covering cyst  Neurologic:  Neurovascularly intact without any focal sensory/motor deficits.  Cranial nerves: II-XII intact, grossly non-focal.  Psychiatric:  Alert and oriented, thought content appropriate, normal insight  Capillary Refill: Brisk,< 3 seconds   Peripheral Pulses: +2 palpable, equal bilaterally       Labs:     Recent Labs     01/23/21  2230   WBC 16.6*   HGB 12.9*   HCT 38.2*    Recent Labs     01/23/21  2230   *   K 3.5   CL 97*   CO2 23   BUN 7   CREATININE 0.8*   CALCIUM 9.1     Recent Labs     01/23/21  2230   AST 21   ALT 29   BILITOT 0.5   ALKPHOS 67     Recent Labs     01/23/21  2250   INR 1.05     No results for input(s): Priscilla Mirman in the last 72 hours. Urinalysis:      Lab Results   Component Value Date    NITRU Negative 01/23/2021    WBCUA 3-5 01/23/2021    BACTERIA Rare 01/23/2021    RBCUA 5-10 01/23/2021    BLOODU MODERATE 01/23/2021    SPECGRAV 1.025 01/23/2021    GLUCOSEU Negative 01/23/2021       Radiology:     EKG:  I have reviewed the EKG with the following interpretation: sinus tach, 105, nl axis, no ischemia appreciated    CT ABDOMEN PELVIS W IV CONTRAST Additional Contrast? None   Final Result   Left inguinal fat stranding extending into the medial left upper thigh with   no evidence of a localized fluid collection/abscess. Findings are consistent   with cellulitis. There is no soft tissue gas to suggest necrotizing   fasciitis. No acute finding in the abdomen or pelvis. Fatty infiltration of the liver. Small bilateral fat containing inguinal hernias. XR CHEST PORTABLE   Final Result   Stable chest with no acute abnormality seen.              ASSESSMENT:    Active Hospital Problems    Diagnosis Date Noted    Cellulitis [L03.90] 01/24/2021         PLAN:    Early sepsis- POA, with fever, tachycardia, leukocytosis and skin source, CT w/o obvious abscess however pt noted drainage, nl  Lactate, rapid covid negative  -gen surg consulted   -started on iv vanc/rocephin on 1/24, adjust as needed  -bcx obtained    HTN-stable, on acei    Hyponatremia- mild, 128 on admission, suspect volume depletion  -ivfs given, monitored labs Obesity -  With Body mass index is 41.43 kg/m². Complicating assessment and treatment. Placing patient at risk for multiple co-morbidities as well as early death and contributing to the patient's presentation. Counseled on weight loss. DVT Prophylaxis: lovenox  Diet: DIET GENERAL;  Code Status: Full Code    PT/OT Eval Status: not needed    Dispo - pending improvement, ?by Rodolfo Hunter MD    Thank you No primary care provider on file. for the opportunity to be involved in this patient's care. If you have any questions or concerns please feel free to contact me at [phone].

## 2021-01-24 NOTE — PROGRESS NOTES
Changed Mepilex foam that MD had placed on patient's upper inner thigh. Dressing saturated with serosanguinous fluid. Patient tolerated well. Swelling still within limits of marked area.   TANVI CRAWLEY

## 2021-01-24 NOTE — CONSULTS
Pharmacy to Dose Vancomycin    Dx: [facility]  Goal trough = 10-15  Pt wt = 113.4 kg  Recent Labs     01/23/21 2230   CREATININE 0.8*     Recent Labs     01/23/21 2230   WBC 16.6*     Vanc 2g given in ED. Start Vanc 1500 mg IV q12h. Vancomycin trough: 1/25 1100.   Ane Dress 1/24/2021 4:32 AM

## 2021-01-24 NOTE — ED PROVIDER NOTES
[facility]  EMERGENCY DEPARTMENT ENCOUNTER        Pt Name: Ashlee Lewis  MRN: [id number]  Len[place] 1990  Date of evaluation: 1/23/2021  Provider: Khari Askew PA-C  PCP: No primary care provider on file. I have seen and evaluated this patient with my supervising physician Naldo Gagnon, [facility]       Chief Complaint   Patient presents with    Abdominal Pain     patient reports that abdominal pain at noon, reports that took ibuprofen at 1700 reports had a fever of 101. patient reports pain is 7 on 1-10 scale to mid abdomen    Dysuria     patient reports painful urination that started 45 minutes ago     Cyst     patient reports left groin cyst, swelling that busted open at 1030 after a bath that bled, reports that still bleeding and swelling        HISTORY OF PRESENT ILLNESS   (Location, Timing/Onset, Context/Setting, Quality, Duration, Modifying Factors, Severity, Associated Signs and Symptoms)  Note limiting factors. Ashlee Lewis is a 26 y.o. male patient presenting with complaint infection left groin/perineal/buttock area. Onset 2 weeks ago. Worse over the past 72 hours. This morning 11:30 AM he took a hot bath as a guide the bath it open to drain he continues draining a foul-smelling brownish/blackish substance/purulence. Indicates he spiked a fever earlier today. He took ibuprofen 800 mg at 8 PM.  He has had no Tylenol. Patient noted blood pressure bit elevated. He is on lisinopril 20 mg. He typically takes this in the evening. He took 20 mg at 10 PM this evening. Indicates chills but no rigors. He indicates no shortness of breath or chest pain. No nausea, vomiting or diarrhea. Patient does report having had a pilonidal cyst/abscess 2013. Patient also indicating some dysuria occurring over the past 45 minutes. States hot in character. I will check a UA. Nursing Notes were all reviewed and agreed with or any disagreements were addressed in the HPI. REVIEW OF SYSTEMS    (2-9 systems for level 4, 10 or more for level 5)     Review of Systems    Positives and Pertinent negatives as per HPI. Except as noted above in the ROS, all other systems were reviewed and negative. PAST MEDICAL HISTORY     Past Medical History:   Diagnosis Date    Asthma     Hyperlipidemia     Hypertension          SURGICAL HISTORY   History reviewed. No pertinent surgical history. [address]       Current Discharge Medication List      CONTINUE these medications which have NOT CHANGED    Details   ibuprofen (ADVIL;MOTRIN) 400 MG tablet Take 1 tablet by mouth every 6 hours as needed for Pain  Qty: 20 tablet, Refills: 0      lisinopril (PRINIVIL;ZESTRIL) 20 MG tablet Take 20 mg by mouth daily      acetaminophen (TYLENOL) 500 MG tablet Take 2 tablets by mouth every 6 hours as needed for Pain  Qty: 40 tablet, Refills: 0      oxyCODONE-acetaminophen (PERCOCET) 5-325 MG per tablet Take 1 tablet by mouth every 8 hours as needed for Pain. Qty: 15 tablet, Refills: 0               ALLERGIES     Patient has no known allergies.     FAMILYHISTORY       Family History   Problem Relation Age of Onset    Asthma Father     Heart Disease Father     High Blood Pressure Father     High Cholesterol Father     Asthma Brother           SOCIAL HISTORY       Social History     Tobacco Use    Smoking status: Current Every Day Smoker     Packs/day: 1.00     Types: Cigarettes    Smokeless tobacco: Never Used   Substance Use Topics    Alcohol use: No    Drug use: Not Currently       SCREENINGS    Marcelina Coma Scale  Eye Opening: Spontaneous  Best Verbal Response: Oriented  Best Motor Response: Obeys commands  Marcelina Coma Scale Score: 15        PHYSICAL EXAM    (up to 7 for level 4, 8 or more for level 5)     ED Triage Vitals   BP Temp Temp Source Pulse Resp SpO2 Height Weight 01/23/21 2237 01/23/21 2237 01/23/21 2237 01/23/21 2237 01/23/21 2237 01/23/21 2237 01/23/21 2235 01/23/21 2235   (!) 144/73 98.6 °F (37 °C) Oral 114 16 97 % 5' 6\" (1.676 m) 250 lb (113.4 kg)       Physical Exam  Vitals signs and nursing note reviewed. Constitutional:       Appearance: Normal appearance. He is well-developed. He is obese. HENT:      Head: Normocephalic and atraumatic. Right Ear: External ear normal.      Left Ear: External ear normal.   Eyes:      General: No scleral icterus. Right eye: No discharge. Left eye: No discharge. Conjunctiva/sclera: Conjunctivae normal.   Neck:      Musculoskeletal: Normal range of motion and neck supple. Cardiovascular:      Rate and Rhythm: Regular rhythm. Tachycardia present. Heart sounds: Normal heart sounds. Pulmonary:      Effort: Pulmonary effort is normal.      Breath sounds: Normal breath sounds. Abdominal:      General: Abdomen is flat. Bowel sounds are normal.   Musculoskeletal: Normal range of motion. Comments: Patient has on the perineal area left side induration, erythema and drainage. It extends anteriorly. He has groin tenderness and adenopathy noted. He has no genital involvement. Skin:     General: Skin is warm and dry. Neurological:      General: No focal deficit present. Mental Status: He is alert and oriented to person, place, and time. Mental status is at baseline. Psychiatric:         Mood and Affect: Mood normal.         Behavior: Behavior normal.         Thought Content:  Thought content normal.         Judgment: Judgment normal.         DIAGNOSTIC RESULTS   LABS:    Labs Reviewed   CBC WITH AUTO DIFFERENTIAL - Abnormal; Notable for the following components:       Result Value    WBC 16.6 (*)     Hemoglobin 12.9 (*)     Hematocrit 38.2 (*)     Neutrophils Absolute 13.6 (*)     Monocytes Absolute 1.5 (*)     Bands Relative 10 (*)     All other components within normal limits    Narrative: Performed at:  [place] [address],  [place], [facility]   Phone [phone]   COMPREHENSIVE METABOLIC PANEL - Abnormal; Notable for the following components:    Sodium 128 (*)     Chloride 97 (*)     Glucose 178 (*)     CREATININE 0.8 (*)     All other components within normal limits    Narrative:     Performed at:  [address],  [place], [facility]   Phone [phone]   URINE RT REFLEX TO CULTURE - Abnormal; Notable for the following components:    Blood, Urine MODERATE (*)     Protein, UA TRACE (*)     All other components within normal limits    Narrative:     Performed at:  [address],  [place], [place] [facility]   Phone [phone]   MICROSCOPIC URINALYSIS - Abnormal; Notable for the following components:    Mucus, UA Rare (*)     RBC, UA 5-10 (*)     Bacteria, UA Rare (*)     All other components within normal limits    Narrative:     Performed at:  [address],  [place], [place] [facility]   Phone [phone]   CULTURE, BLOOD 2   CULTURE, BLOOD 1   LACTIC ACID, PLASMA    Narrative:     Performed at:  [place] [address],  [place], [place] [facility]   Phone [phone]   PROTIME-INR    Narrative:     Performed at:  [address],  [place], [place] [facility]   Phone [phone]    Narrative:     Performed at:  [address],  [place], [place] [facility]   Phone [phone]   PROCALCITONIN    Narrative:     Performed at:  [address],  [place], [place] [facility]   Phone [phone]       All other labs were within normal range or not returned as of this dictation. EKG: All EKG's are interpreted by the Emergency Department Physician in the absence of a cardiologist.  Please see their note for interpretation of EKG. RADIOLOGY:   Non-plain film images such as CT, Ultrasound and MRI are read by the radiologist. Plain radiographic images are visualized and preliminarily interpreted by the ED Provider with the below findings:        Interpretation per the Radiologist below, if available at the time of this note:    CT ABDOMEN PELVIS W IV CONTRAST Additional Contrast? None   Final Result   Left inguinal fat stranding extending into the medial left upper thigh with   no evidence of a localized fluid collection/abscess. Findings are consistent   with cellulitis. There is no soft tissue gas to suggest necrotizing   fasciitis. No acute finding in the abdomen or pelvis. Fatty infiltration of the liver. Small bilateral fat containing inguinal hernias. XR CHEST PORTABLE   Final Result   Stable chest with no acute abnormality seen. No results found.         PROCEDURES   Unless otherwise noted below, none     Procedures    CRITICAL CARE TIME   N/A    CONSULTS:  IP CONSULT TO HOSPITALIST  PHARMACY TO DOSE VANCOMYCIN  IP CONSULT TO GENERAL SURGERY      EMERGENCY DEPARTMENT COURSE and DIFFERENTIAL DIAGNOSIS/MDM:   Vitals:    Vitals:    01/24/21 0555 01/24/21 0815 01/24/21 1215 01/24/21 1612   BP: (!) 144/77 129/81 134/77 126/73   Pulse: 84 102 85 99   Resp: 16 16     Temp: 97.5 °F (36.4 °C) 98.7 °F (37.1 °C) 98 °F (36.7 °C) 98.7 °F (37.1 °C)   TempSrc: Oral Oral Oral Axillary   SpO2: 98% 98% 96% 98%   Weight: 264 lb 8 oz (120 kg)      Height: 5' 7\" (1.702 m)          Patient was given the following medications:  Medications   sodium chloride flush 0.9 % injection 10 mL (10 mLs Intravenous Given 1/24/21 0850)   sodium chloride flush 0.9 % injection 10 mL (has no administration in time range) potassium chloride 10 mEq/100 mL IVPB (Peripheral Line) (has no administration in time range)   magnesium sulfate 2000 mg in 50 mL IVPB premix (has no administration in time range)   promethazine (PHENERGAN) tablet 12.5 mg (has no administration in time range)     Or   ondansetron (ZOFRAN) injection 4 mg (has no administration in time range)   famotidine (PEPCID) tablet 20 mg (has no administration in time range)   acetaminophen (TYLENOL) tablet 650 mg (650 mg Oral Given 1/24/21 0938)     Or   acetaminophen (TYLENOL) suppository 650 mg ( Rectal See Alternative 1/24/21 0938)   cefTRIAXone (ROCEPHIN) 2000 mg IVPB in D5W 50ml minibag (0 mg Intravenous Stopped 1/24/21 0918)   vancomycin 1500 mg in dextrose 5% 300 mL IVPB (0 mg Intravenous Stopped 1/24/21 1408)   lisinopril (PRINIVIL;ZESTRIL) tablet 20 mg (20 mg Oral Given 1/24/21 1027)   piperacillin-tazobactam (ZOSYN) 4,500 mg in dextrose 5 % 100 mL IVPB (mini-bag) (0 mg Intravenous Stopped 1/24/21 0004)   acetaminophen (TYLENOL) tablet 1,000 mg (1,000 mg Oral Given 1/23/21 2317)   lactated ringers infusion 1,914 mL (0 mLs Intravenous Stopped 1/24/21 0407)   iopamidol (ISOVUE-370) 76 % injection 75 mL (75 mLs Intravenous Given 1/24/21 0041)   0.9 % sodium chloride infusion (1,000 mLs Intravenous New Bag 1/24/21 0408)         My shift ends and the final disposition of this patient will be managed by my attending physician. Patient resenting with 2-week history of progressive pain and swelling left groin area. Drainage noted earlier today. Drainage continues. CT scan shows generalized inflammatory changes without focal abscess. No evidence of gas in tissue to suggest necrotizing fasciitis. Patient started on vancomycin and Zosyn. Patient given Tylenol for fever. Given lactated Ringer's at 30 mL/kg. Final disposition by attending physician.       FINAL IMPRESSION      Cellulitis left groin  Leukocytosis  Hyponatremia  Fever adult    DISPOSITION/PLAN DISPOSITION        PATIENT REFERREDTO:  No follow-up provider specified. DISCHARGE MEDICATIONS:  Current Discharge Medication List          DISCONTINUED MEDICATIONS:  Current Discharge Medication List                 (Please note that portions of this note were completed with a voice recognition program.  Efforts were made to edit the dictations but occasionally words are mis-transcribed. )    Ann Bashir PA-C (electronically signed)           Ann Bashir PA-C  01/24/21 1879

## 2021-01-25 VITALS
HEIGHT: 67 IN | DIASTOLIC BLOOD PRESSURE: 66 MMHG | RESPIRATION RATE: 16 BRPM | BODY MASS INDEX: 40.74 KG/M2 | WEIGHT: 259.6 LBS | HEART RATE: 84 BPM | TEMPERATURE: 98.3 F | OXYGEN SATURATION: 97 % | SYSTOLIC BLOOD PRESSURE: 100 MMHG

## 2021-01-25 LAB
A/G RATIO: 1.2 (ref 1.1–2.2)
ALBUMIN SERPL-MCNC: 3.8 G/DL (ref 3.4–5)
ALP BLD-CCNC: 61 U/L (ref 40–129)
ALT SERPL-CCNC: 35 U/L (ref 10–40)
ANION GAP SERPL CALCULATED.3IONS-SCNC: 11 MMOL/L (ref 3–16)
AST SERPL-CCNC: 19 U/L (ref 15–37)
BASOPHILS ABSOLUTE: 0.1 K/UL (ref 0–0.2)
BASOPHILS RELATIVE PERCENT: 0.5 %
BILIRUB SERPL-MCNC: 0.4 MG/DL (ref 0–1)
BUN BLDV-MCNC: 7 MG/DL (ref 7–20)
CALCIUM SERPL-MCNC: 9.2 MG/DL (ref 8.3–10.6)
CHLORIDE BLD-SCNC: 99 MMOL/L (ref 99–110)
CO2: 25 MMOL/L (ref 21–32)
CREAT SERPL-MCNC: 0.7 MG/DL (ref 0.9–1.3)
EOSINOPHILS ABSOLUTE: 0.4 K/UL (ref 0–0.6)
EOSINOPHILS RELATIVE PERCENT: 3.4 %
GFR AFRICAN AMERICAN: >60
GFR NON-AFRICAN AMERICAN: >60
GLOBULIN: 3.1 G/DL
GLUCOSE BLD-MCNC: 162 MG/DL (ref 70–99)
HCT VFR BLD CALC: 36.6 % (ref 40.5–52.5)
HEMOGLOBIN: 12.5 G/DL (ref 13.5–17.5)
LYMPHOCYTES ABSOLUTE: 2.3 K/UL (ref 1–5.1)
LYMPHOCYTES RELATIVE PERCENT: 22 %
MCH RBC QN AUTO: 29.5 PG (ref 26–34)
MCHC RBC AUTO-ENTMCNC: 34.3 G/DL (ref 31–36)
MCV RBC AUTO: 86 FL (ref 80–100)
MONOCYTES ABSOLUTE: 0.8 K/UL (ref 0–1.3)
MONOCYTES RELATIVE PERCENT: 7.3 %
NEUTROPHILS ABSOLUTE: 7.1 K/UL (ref 1.7–7.7)
NEUTROPHILS RELATIVE PERCENT: 66.8 %
PDW BLD-RTO: 13.1 % (ref 12.4–15.4)
PLATELET # BLD: 274 K/UL (ref 135–450)
PMV BLD AUTO: 7.6 FL (ref 5–10.5)
POTASSIUM REFLEX MAGNESIUM: 3.9 MMOL/L (ref 3.5–5.1)
RBC # BLD: 4.25 M/UL (ref 4.2–5.9)
SODIUM BLD-SCNC: 135 MMOL/L (ref 136–145)
TOTAL PROTEIN: 6.9 G/DL (ref 6.4–8.2)
WBC # BLD: 10.6 K/UL (ref 4–11)

## 2021-01-25 PROCEDURE — 6370000000 HC RX 637 (ALT 250 FOR IP): Performed by: INTERNAL MEDICINE

## 2021-01-25 PROCEDURE — 99232 SBSQ HOSP IP/OBS MODERATE 35: CPT | Performed by: SURGERY

## 2021-01-25 PROCEDURE — 6360000002 HC RX W HCPCS: Performed by: INTERNAL MEDICINE

## 2021-01-25 PROCEDURE — 36415 COLL VENOUS BLD VENIPUNCTURE: CPT

## 2021-01-25 PROCEDURE — 85025 COMPLETE CBC W/AUTO DIFF WBC: CPT

## 2021-01-25 PROCEDURE — 80053 COMPREHEN METABOLIC PANEL: CPT

## 2021-01-25 PROCEDURE — 2580000003 HC RX 258: Performed by: INTERNAL MEDICINE

## 2021-01-25 RX ORDER — CLINDAMYCIN HYDROCHLORIDE 300 MG/1
300 CAPSULE ORAL 3 TIMES DAILY
Qty: 30 CAPSULE | Refills: 0 | Status: SHIPPED | OUTPATIENT
Start: 2021-01-25 | End: 2021-02-04

## 2021-01-25 RX ADMIN — Medication 1500 MG: at 04:18

## 2021-01-25 RX ADMIN — ACETAMINOPHEN 650 MG: 325 TABLET ORAL at 08:33

## 2021-01-25 RX ADMIN — Medication 1500 MG: at 14:58

## 2021-01-25 RX ADMIN — LISINOPRIL 20 MG: 20 TABLET ORAL at 08:32

## 2021-01-25 RX ADMIN — CEFTRIAXONE SODIUM 2000 MG: 2 INJECTION, POWDER, FOR SOLUTION INTRAMUSCULAR; INTRAVENOUS at 08:32

## 2021-01-25 ASSESSMENT — PAIN DESCRIPTION - PROGRESSION: CLINICAL_PROGRESSION: GRADUALLY WORSENING

## 2021-01-25 ASSESSMENT — PAIN SCALES - GENERAL
PAINLEVEL_OUTOF10: 4
PAINLEVEL_OUTOF10: 0

## 2021-01-25 NOTE — PROGRESS NOTES
Pt ok for discharge per MD. Discharge instructions and script given. IV removed. [facility] called and telemetry removed. No questions or concerns at this time. Patient ambulated with staff to main entrance to private car.

## 2021-01-25 NOTE — CARE COORDINATION
Sw reviewed chart and notes that pt will likely discharge home with oral abx. Please notify Sw should this change. Pt has insurance which would eliminate any potential barriers.

## 2021-01-25 NOTE — PROGRESS NOTES
[facility]GERY    PATIENT NAME: Judith Luna     TODAY'S DATE: 1/25/2021    CHIEF COMPLAINT: Thigh pain    INTERVAL HISTORY/HPI:    Pt states his medial thigh pain is improved. He denies fever or chills. He states it is still draining but feels better. He is moving better, and the swelling is better. REVIEW OF SYSTEMS:  Pertinent positives and negatives as per interval history section    OBJECTIVE:  VITALS:  /66   Pulse 84   Temp 98.3 °F (36.8 °C) (Oral)   Resp 16   Ht 5' 7\" (1.702 m)   Wt 259 lb 9.6 oz (117.8 kg)   SpO2 97%   BMI 40.66 kg/m²     INTAKE/OUTPUT:    I/O last 3 completed shifts: In: 2280 [P.O.:2280]  Out: 2650 [Urine:2650]  I/O this shift:  In: 360 [P.O.:360]  Out: 425 [Urine:425]    CONSTITUTIONAL:  awake and alert  LUNGS:  Respirations easy and unlabored, clear to auscultation  CARD:  regular rate and rhythm  ABDOMEN:  normal bowel sounds, soft, non-distended, non-tender   SKIN: Left medial thigh with less erythema and induration. There is persistent drainage from the site but no apparent accumulation    Data:  CBC:   Recent Labs     01/23/21 2230 01/25/21  0857   WBC 16.6* 10.6   HGB 12.9* 12.5*   HCT 38.2* 36.6*    274     BMP:    Recent Labs     01/23/21 2230 01/25/21  0857   * 135*   K 3.5 3.9   CL 97* 99   CO2 23 25   BUN 7 7   CREATININE 0.8* 0.7*   GLUCOSE 178* 162*     Hepatic:   Recent Labs     01/23/21 2230 01/25/21  0857   AST 21 19   ALT 29 35   BILITOT 0.5 0.4   ALKPHOS 67 61     Mag:    No results for input(s): MG in the last 72 hours. Phos:   No results for input(s): PHOS in the last 72 hours. INR:   Recent Labs     01/23/21  2250   INR 1.05       Radiology Review:  *Imaging personally reviewed by me. N/A      ASSESSMENT AND PLAN:  Left medial thigh abscess. His leukocytosis has resolved. Erythema is improved. No fevers overnight.   Okay for discharge from surgical standpoint on oral antibiotics Electronically signed by Ines Flood MD     [id number]

## 2021-01-25 NOTE — PROGRESS NOTES
Hospitalist Progress Note      PCP: No primary care provider on file. Date of Admission: 1/23/2021    Chief Complaint: Groin cyst    Subjective: no new c/o. Medications:  Reviewed    Infusion Medications   Scheduled Medications    sodium chloride flush  10 mL Intravenous 2 times per day    cefTRIAXone (ROCEPHIN) IV  2,000 mg Intravenous Q24H    vancomycin  1,500 mg Intravenous Q12H    lisinopril  20 mg Oral Daily     PRN Meds: sodium chloride flush, promethazine **OR** ondansetron, famotidine, acetaminophen **OR** acetaminophen      Intake/Output Summary (Last 24 hours) at 1/25/2021 0953  Last data filed at 1/25/2021 6030  Gross per 24 hour   Intake 1920 ml   Output 2275 ml   Net -355 ml       Physical Exam Performed:    /72   Pulse 93   Temp 99.2 °F (37.3 °C) (Oral)   Resp 16   Ht 5' 7\" (1.702 m)   Wt 259 lb 9.6 oz (117.8 kg)   SpO2 97%   BMI 40.66 kg/m²     General appearance: No apparent distress, appears stated age and cooperative. HEENT: Pupils equal, round, and reactive to light. Conjunctivae/corneas clear. Neck: Supple, with full range of motion. No jugular venous distention. Trachea midline. Respiratory:  Normal respiratory effort. Clear to auscultation, bilaterally without Rales/Wheezes/Rhonchi. Cardiovascular: Regular rate and rhythm with normal S1/S2 without murmurs, rubs or gallops. Abdomen: Soft, non-tender, non-distended with normal bowel sounds. Musculoskeletal: No clubbing, cyanosis or edema bilaterally. Full range of motion without deformity. Skin: Skin color, texture, turgor normal.  No rashes or lesions. Neurologic:  Neurovascularly intact without any focal sensory/motor deficits.  Cranial nerves: II-XII intact, grossly non-focal.  Psychiatric: Alert and oriented, thought content appropriate, normal insight  Capillary Refill: Brisk,< 3 seconds   Peripheral Pulses: +2 palpable, equal bilaterally       Labs:   Recent Labs     01/23/21  2230 01/25/21  0857 WBC 16.6* 10.6   HGB 12.9* 12.5*   HCT 38.2* 36.6*    274     Recent Labs     01/23/21  2230   *   K 3.5   CL 97*   CO2 23   BUN 7   CREATININE 0.8*   CALCIUM 9.1     Recent Labs     01/23/21  2230   AST 21   ALT 29   BILITOT 0.5   ALKPHOS 67     Recent Labs     01/23/21  2250   INR 1.05     No results for input(s): Raisa Gaster in the last 72 hours. Urinalysis:      Lab Results   Component Value Date    NITRU Negative 01/23/2021    WBCUA 3-5 01/23/2021    BACTERIA Rare 01/23/2021    RBCUA 5-10 01/23/2021    BLOODU MODERATE 01/23/2021    SPECGRAV 1.025 01/23/2021    GLUCOSEU Negative 01/23/2021       Consults:    IP CONSULT TO HOSPITALIST  PHARMACY TO DOSE VANCOMYCIN  IP CONSULT TO GENERAL SURGERY      Assessment/Plan:    Active Hospital Problems    Diagnosis    Cellulitis [L03.90]       Thigh Abscess - w/ cellulitis. Started on IV Vanco/Rocephin - continued. General Surgery consulted and appreciated w/out need for surgical drainage. Sepsis - w/ Leukocytosis/Tachycardia/Fever POArrival 2nd to above infection. Continue IVF as appropriate and monitor clinical response w/ ABX as written.      HTN - w/out known CAD and no evidence of active signs/sxs of ischemia/failure. Currently controlled on home meds w/ vitals reviewed and documented as above. HypoNatremia - etiology clinically unable to determine but likely hypovolemic. Follow serial labs on IVF. Reviewed and documented as above. Obesity -  With Body mass index is 40.66 kg/m². Complicating assessment and treatment. Placing patient at risk for multiple co-morbidities as well as early death and contributing to the patient's presentation. Counseled on weight loss.       DVT Prophylaxis: None  Diet: DIET GENERAL;  Code Status: Full Code      PT/OT Eval Status: not yet ordered. Dispo - Possibly Tues/Wed 26/27 Jan pending clinical course.      Evonne Huang MD

## 2021-01-25 NOTE — ED PROVIDER NOTES
Attending Supervisory Note/Shared Visit   I have personally performed a face to face diagnostic evaluation on this patient. I have reviewed the mid-levels findings and agree. History and Exam by me shows appearing male in no acute distress. She presented to the ED for evaluation of pain swelling and drainage to the left proximal portion of the left leg with extension of the perineum. He reports symptoms have been worsening for the past few days and began having drainage today. He states that initially it was a purulent discharge and ask brownish in nature. States he has been having subjective fevers at home as well as diaphoresis. Denies a history of previous abscess or cellulitis. He does report some scrotal pain and discomfort. Denies fevers nausea vomiting or changes in bowel or urine function. On presentation patient noted to be tachycardic. He has induration, warmth and erythema to the medial thigh extending to the perineum. There is no active discharge signs of fluctuance. No crepitus. Abdomen is soft. Initially seen evaluated by the advanced practice provider. Laboratory remarkable for hyponatremia as well as an elevated white blood cell count. Lactate within normal limits. Tachycardia did improve with IV fluids. Patient given doses of IV antibiotics in the emergency department. Based the patient's tachycardia leukocytosis subjective fevers he does meet sepsis criteria. Lactate is within normal limits. CT obtained did not show signs of subcutaneous emphysema, was not concerning for necrotizing fasciitis. Based in the patient's presentation discussed admission to the hospital for the medical management with the patient. He is amenable to treatment plan. Disposition:  1. Hyponatremia    2.  Septicemia ([facility] [address]    3. Cellulitis of left lower extremity          Jelly Mims MD  Attending Emergency Physician          Jelly Mims MD  01/25/21 4405

## 2021-01-26 NOTE — DISCHARGE SUMMARY
Hospital Medicine Discharge Summary    Patient ID: Hank Magaña      Patient's PCP: No primary care provider on file. Admit Date: 1/23/2021     Discharge Date: 1/25/2021      Admitting Physician: Denisha Gao DO     Discharge Physician: Marylou Perez MD     Discharge Diagnoses: Active Hospital Problems    Diagnosis    Cellulitis [L03.90]       The patient was seen and examined on day of discharge and this discharge summary is in conjunction with any daily progress note from day of discharge. Hospital Course:            Thigh Abscess - w/ cellulitis. Started on IV Vanco/Rocephin - continued. General Surgery consulted and appreciated w/out need for surgical drainage, to complete PO ABX at discharge.      Sepsis - w/ Leukocytosis/Tachycardia/Fever POArrival 2nd to above infection. Continue IVF as appropriate and monitor clinical response w/ ABX as written.      HTN - w/out known CAD and no evidence of active signs/sxs of ischemia/failure. Currently controlled on home meds .     HypoNatremia - etiology clinically unable to determine but likely hypovolemic. Followed serial labs on IVF. Now taking adequate PO     Obesity -  With Body mass index is 40.66 kg/m². Complicating assessment and treatment. Placing patient at risk for multiple co-morbidities as well as early death and contributing to the patient's presentation. Counseled on weight loss.       Labs:  For convenience and continuity at follow-up the following most recent labs are provided:      CBC:    Lab Results   Component Value Date    WBC 10.6 01/25/2021    HGB 12.5 01/25/2021    HCT 36.6 01/25/2021     01/25/2021       Renal:    Lab Results   Component Value Date     01/25/2021    K 3.9 01/25/2021    CL 99 01/25/2021    CO2 25 01/25/2021    BUN 7 01/25/2021    CREATININE 0.7 01/25/2021    CALCIUM 9.2 01/25/2021         Significant Diagnostic Studies    Radiology: CT ABDOMEN PELVIS W IV CONTRAST Additional Contrast? None   Final Result   Left inguinal fat stranding extending into the medial left upper thigh with   no evidence of a localized fluid collection/abscess. Findings are consistent   with cellulitis. There is no soft tissue gas to suggest necrotizing   fasciitis. No acute finding in the abdomen or pelvis. Fatty infiltration of the liver. Small bilateral fat containing inguinal hernias. XR CHEST PORTABLE   Final Result   Stable chest with no acute abnormality seen. Consults:     IP CONSULT TO HOSPITALIST  PHARMACY TO DOSE VANCOMYCIN  IP CONSULT TO GENERAL SURGERY    Disposition: home     Condition at Discharge: Stable    Discharge Instructions/Follow-up:  w/ PCP 1-2 weeks and subspecialists as arranged. Code Status:  Full Code    Activity: activity as tolerated    Diet: regular diet      Discharge Medications:     Discharge Medication List as of 1/25/2021  5:06 PM           Details   clindamycin (CLEOCIN) 300 MG capsule Take 1 capsule by mouth 3 times daily for 10 days, Disp-30 capsule, R-0Print              Details   acetaminophen (TYLENOL) 500 MG tablet Take 2 tablets by mouth every 6 hours as needed for Pain, Disp-40 tablet,R-0Print      ibuprofen (ADVIL;MOTRIN) 400 MG tablet Take 1 tablet by mouth every 6 hours as needed for Pain, Disp-20 tablet,R-0Print      lisinopril (PRINIVIL;ZESTRIL) 20 MG tablet Take 20 mg by mouth dailyHistorical Med      oxyCODONE-acetaminophen (PERCOCET) 5-325 MG per tablet Take 1 tablet by mouth every 8 hours as needed for Pain., Disp-15 tablet, R-0             Time Spent on discharge is more than 30 minutes in the examination, evaluation, counseling and review of medications and discharge plan.       Signed:    Jim Camara MD   1/26/2021

## 2021-01-28 LAB
BLOOD CULTURE, ROUTINE: NORMAL
CULTURE, BLOOD 2: NORMAL

## 2022-12-01 ENCOUNTER — OFFICE VISIT (OUTPATIENT)
Dept: ENT CLINIC | Age: 32
End: 2022-12-01

## 2022-12-01 ENCOUNTER — PROCEDURE VISIT (OUTPATIENT)
Dept: AUDIOLOGY | Age: 32
End: 2022-12-01
Payer: COMMERCIAL

## 2022-12-01 VITALS — HEIGHT: 67 IN | BODY MASS INDEX: 42.38 KG/M2 | TEMPERATURE: 98.6 F | RESPIRATION RATE: 16 BRPM | WEIGHT: 270 LBS

## 2022-12-01 DIAGNOSIS — H90.6 MIXED CONDUCTIVE AND SENSORINEURAL HEARING LOSS OF BOTH EARS: Primary | ICD-10-CM

## 2022-12-01 DIAGNOSIS — H65.93 MIDDLE EAR EFFUSION, BILATERAL: ICD-10-CM

## 2022-12-01 DIAGNOSIS — J39.2 NASOPHARYNGEAL MASS: Primary | ICD-10-CM

## 2022-12-01 DIAGNOSIS — H90.0 CONDUCTIVE HEARING LOSS, BILATERAL: ICD-10-CM

## 2022-12-01 DIAGNOSIS — H91.90 HEARING DIFFICULTY, UNSPECIFIED LATERALITY: Primary | ICD-10-CM

## 2022-12-01 PROCEDURE — 92557 COMPREHENSIVE HEARING TEST: CPT | Performed by: AUDIOLOGIST

## 2022-12-01 PROCEDURE — 92567 TYMPANOMETRY: CPT | Performed by: AUDIOLOGIST

## 2022-12-01 RX ORDER — ATORVASTATIN CALCIUM 80 MG/1
TABLET, FILM COATED ORAL
COMMUNITY
Start: 2022-11-18

## 2022-12-01 RX ORDER — ESCITALOPRAM OXALATE 10 MG/1
TABLET ORAL
COMMUNITY
Start: 2022-11-04

## 2022-12-01 RX ORDER — ICOSAPENT ETHYL 1000 MG/1
CAPSULE ORAL
COMMUNITY
Start: 2022-10-30

## 2022-12-01 RX ORDER — FLUTICASONE PROPIONATE 50 MCG
1 SPRAY, SUSPENSION (ML) NASAL 2 TIMES DAILY
Qty: 16 G | Refills: 2 | Status: SHIPPED | OUTPATIENT
Start: 2022-12-01

## 2022-12-01 ASSESSMENT — ENCOUNTER SYMPTOMS
ABDOMINAL PAIN: 0
SHORTNESS OF BREATH: 0
WHEEZING: 0

## 2022-12-01 NOTE — PATIENT INSTRUCTIONS
Good Communication Strategies    Communication can be challenging for anyone, but can be especially difficult for those with some degree of hearing loss. While we may not be able to control every factor that may lead to difficulty with communication, there are Good Communication Strategies that we can all use in our day-to-day lives. Communication takes both parties working together for it to be successful. Tips as a Listener:   Control your environment. It is important to limit the amount of background noise in the room when possible. You should also consider having a good light source in the room to best see the other person. Ask for clarification. Instead of saying \"What?\", you can use parts of what you heard to make a new question. For example, if you heard the word \"Thursday\" but not the rest of the week, you may ask \"What was that about Thursday? \" or \"What did you want to do Thursday? \". This shows the person talking that you are listening and will help them better explain what they are saying. Be an advocate for yourself. If you are hearing but not understanding, tell the other person \"I can hear you, but I need you to slow down when you speak. \"  Or if someone is facing the other direction, say \"I cannot hear you when you are not looking at me when we talk. \"       Tips as a Talker:   - Sit or stand 3 to 6 feet away to maximize audibility         -- It is unrealistic to believe someone else will fully hear your message if you are speaking from across the room or in a different room in the house   - Stay at eye level to help with visual cues   - Make sure you have the persons attention before speaking   - Use facial expressions and gestures to accentuate your message   - Raise your voice slightly (do not scream)   - Speak slowly and distinctly   - Use short, simple sentences   - Rephrase your words if the person is having a hard time understanding you    - To avoid distortion, dont speak directly into a persons ear      Some additional items that may be helpful:   - Remain patient - this is important for both parties   - Write down items that still cannot be heard/understood. You may write with pen/paper or consider typing/texting on a cell phone or smart device. - If background noise is unavoidable, try to keep yourself in a good position in the room. By sitting at a mckenzie on the side of the restaurant (preferably a corner), it will be easier to communicate than if you were sitting at a table in the middle with background noise surrounding you. Keep yourself positioned away from music speakers or heavy foot traffic. Noise-Induced Hearing Loss  What it is, and what you can do to prevent it      Exposure to loud sounds, in an occupational setting or recreational, can cause permanent hearing loss. Sound is measured in decibels (dB). Noise-induced hearing loss is the ONLY type of preventable hearing loss. Hearing loss related to noise exposure can occur at any age. There are small sensory cells, called inner and outer hair cells, within the inner ear (cochlea). These cells process the loudness (intensity) and pitch (frequency) of sound and send the signal to the brain via our auditory nerve (vestibulocochlear nerve, cranial nerve VIII). When these cells are damaged, they can result in permanent hearing loss and/or tinnitus. The hair cells responsible for high frequency sounds, like birds chirping, are most likely to be damaged due to loud sounds. The high frequency sounds are also very important for our clarity and understanding of speech. OCCUPATIONAL NOISE EXPOSURE RECREATIONAL NOISE EXPOSURE   Some jobs may have exposure to loud sounds in the workplace. These jobs may include but are not limited to:  [address] settings  Manufacturing  Construction  Welding  Landscaping  Hairdressing/hairstyling  [facility]   . .. And more!  Many activities outside of work may cause permanent hearing loss. These activities may include but are not limited to:  Lawnmowers, leaf blowers  Farming equipment and animals (such as pigs squealing)  Chainsaws and other power tools  Playing musical instruments and/or singing  Listening to music too loudly - at concerts, through stereo, through ear buds or headphones  Attending sporting events  Attending fireworks shows or using fireworks at home  Use of firearms  . .. And more! REDUCE OR PROTECT YOUR EARS FROM NOISE EXPOSURE    To do your best to avoid noise-induced hearing loss, here are some tips:  Limit exposure to loud sounds. 85 dB (decibels) is safe for 8 hours. As sounds are louder, the length of time the sound is safe lessens. These numbers are cumulative across a 24-hour period. (NIOSH and CDC, 2002)  85 dB is safe for 8 hours  88 dB is safe for 4 hours  91 dB is safe for 2 hours  94 dB is safe for 1 hour  97 dB is safe for 30 minutes  100 dB is safe for 15 minutes  103 dB is safe for 7.5 minutes  106 dB is safe for 3.75 minutes  109 dB is safe for LESS THAN 2 minutes  112 dB is safe for LESS THAN 1 minute  115 dB is safe for ~ 30 seconds  130 dB can cause IMMEDIATE hearing loss  If you are unsure if a sound is too loud, consider checking the sound level with a \"sound level meter\". There are apps on smart devices that can measure the loudness of the sound. They are not as accurate as expensive equipment used by scientists, but it will give you a guesstimate of how loud the sound is, and if it may be damaging to your hearing. If you cannot avoid loud sounds, here are ways to reduce your exposure:  1. Wear hearing protection  Ear plugs and protective ear muffs can be used to reduce the intensity of the sound. The higher the NRR (noise reduction rating), the better reduction of the intensity of the sound   2.  Turn the volume down  When listening to music, turn the volume down, especially when wearing ear buds or headphones. A good rule of thumb is to not go beyond the middle setting on your device. If you can't hear someone talking to you from arm's length away, your music may be at a level that it can cause damage. If someone else can hear your music from 3 feet away, it may also be at a level that it can cause damage. 3. Walk away from the sound  If you do not have the ability to wear hearing protection or turn down the volume of the sound, you should do your best to move away from the source of the sound. Sound decreases in intensity as we move further from the source. The sound will decrease by 6 dB for every doubling of distance from the sound source. Exposure to these sounds may cause permanent damage to your hearing.   If you suspect your hearing has changed, it is recommended that you have your hearing tested by your audiologist.

## 2022-12-01 NOTE — PROGRESS NOTES
[address], [address], [address] [place] [address]  P: [phone]       Patient     Duy Munroe  1990    Chief Concern     Chief Complaint   Patient presents with    New Patient     Patient states that he a gradual hearing loss since June, states that  it started in one ear and is now in both- Denies loud noise exposure, denies pain denies drainage- States that he hears fluid in his ears       Assessment and Plan      Diagnosis Orders   1. Nasopharyngeal mass  CT SINUS W CONTRAST    fluticasone (FLONASE) 50 MCG/ACT nasal spray      2. Middle ear effusion, bilateral        3. Conductive hearing loss, bilateral          Patient with concern for hearing loss since 06/2022, bilateral middle ear effusions noted. He is a smoker, with no recent fevers, chills, night sweats or epistaxis, but on nasal examination he has nasal edema, and obstructing adenoids versus mass in the nasopharynx. We will obtain CT imaging to rule out bony erosion, have started him on Flonase twice daily -we will consider nasal endoscopy, therapeutic myringotomy versus tympanostomy tubes, and biopsy of nasopharyngeal mass with possible adenoidectomy if no improvement. If bony erosion is noted we will plan for urgent biopsy and we will call him back with results of CT imaging. Return in about 4 weeks (around 12/29/2022). History of Present Illness     27 y.o. male with gradual bilateral hearing loss since 06/2022, no head trauma, illness, barotrauma. Began in the right ear, now bilateral. Bilateral ear fullness, tinnitus (constant ringing, pulsatile), no vertigo, no otalgia, no otorrhea. No family history of early hearing loss. No history of chronic ear infections or tympanostomy tubes. Works as a  (employed).   Current 1 pack/day smoker, no recent fevers, chills, night sweats, epistaxis    Past Medical History     Past Medical History:   Diagnosis Date    Asthma     Hearing loss Hyperlipidemia     Hypertension     Tinnitus        Past Surgical History     No past surgical history on file. Family History     Family History   Problem Relation Age of Onset    Asthma Father     Heart Disease Father     High Blood Pressure Father     High Cholesterol Father     Asthma Brother        Social History     Social History     Tobacco Use    Smoking status: Every Day     Packs/day: 1.00     Types: Cigarettes    Smokeless tobacco: Never   Vaping Use    Vaping Use: Never used   Substance Use Topics    Alcohol use: No    Drug use: Not Currently        Allergies     No Known Allergies    Medications     Current Outpatient Medications   Medication Sig Dispense Refill    atorvastatin (LIPITOR) 80 MG tablet       escitalopram (LEXAPRO) 10 MG tablet       metFORMIN (GLUCOPHAGE) 500 MG tablet       Icosapent Ethyl (VASCEPA) 1 g CAPS capsule       fluticasone (FLONASE) 50 MCG/ACT nasal spray 1 spray by Each Nostril route 2 times daily 16 g 2    lisinopril (PRINIVIL;ZESTRIL) 20 MG tablet Take 20 mg by mouth daily       No current facility-administered medications for this visit. Review of Systems     Review of Systems   Constitutional:  Negative for activity change, chills, diaphoresis, fatigue and fever. HENT:  Positive for hearing loss and tinnitus. Negative for congestion, ear discharge and ear pain. Eyes:  Negative for visual disturbance. Respiratory:  Negative for shortness of breath and wheezing. Cardiovascular:  Negative for chest pain. Gastrointestinal:  Negative for abdominal pain. Musculoskeletal:  Negative for neck pain and neck stiffness. Skin:  Negative for rash. Neurological:  Negative for dizziness, light-headedness and headaches. Hematological:  Negative for adenopathy. PhysicalExam     Vitals:    12/01/22 0957   Resp: 16   Temp: 98.6 °F (37 °C)   TempSrc: Infrared   Weight: 270 lb (122.5 kg)   Height: 5' 7\" (1.702 m)       Physical Exam  Vitals reviewed. Constitutional:       Appearance: Normal appearance. HENT:      Head: Normocephalic and atraumatic. Right Ear: Ear canal and external ear normal. A middle ear effusion is present. There is no impacted cerumen. Left Ear: Ear canal and external ear normal. A middle ear effusion is present. There is no impacted cerumen. Nose: No congestion or rhinorrhea. Mouth/Throat:      Lips: Pink. No lesions. Mouth: Mucous membranes are moist. No oral lesions. Tongue: No lesions. Tongue does not deviate from midline. Palate: No mass and lesions. Pharynx: Oropharynx is clear. Uvula midline. No oropharyngeal exudate or posterior oropharyngeal erythema. Eyes:      Extraocular Movements: Extraocular movements intact. Pupils: Pupils are equal, round, and reactive to light. Musculoskeletal:      Cervical back: Normal range of motion and neck supple. Lymphadenopathy:      Cervical: No cervical adenopathy. Neurological:      Mental Status: He is alert. Cranial Nerves: No cranial nerve deficit. Data Review        Procedure     Nasal Endoscopy    Pre OP: Bilateral middle ear effusion, tobacco use, rule out nasopharyngeal mass  Post OP: Rhinitis, enlarged adenoids versus nasopharyngeal mass    Verbal consent was received. After topical anesthesia and decongestion had been obtained using aerosolized 1% lidocaine and oxymetazoline, a 45 degree rigid endoscope was placed into both nares with the patient in a sitting position.  The following was observed:    Right Nasal Cavity and Paranasal Sinuses:  Polyp score = 0 (0 = no polyps, 1 = small polyps in middle meatus not reaching below the inferior border of the middle edmund, 2 = polyps reaching below the middle border of the middle turbinate, 3= large polyps reaching the lower border of the inferior turbinate or polyps medial to the middle edumnd, 4= large polyps causing almost complete congestion/obstruction of the interior meatus)  Edema score = 1 (0 = absent, 1 = mild, 2 = severe)  Discharge score = 1 (0 = no discharge, 1 = clear thin discharge, 2 = thick purulent discharge)    Left Nasal Cavity and Paranasal Sinuses:    Polyp score = 0 (0 = no polyps, 1 = small polyps in middle meatus not reaching below the inferior border of the middle edmund, 2 = polyps reaching below the middle border of the middle turbinate, 3= large polyps reaching the lower border of the inferior turbinate or polyps medial to the middle edmund, 4= large polyps causing almost complete congestion/obstruction of the interior meatus)  Edema score = 1 (0 = absent, 1 = mild, 2 = severe)  Discharge score = 1 (0 = no discharge, 1 = clear thin discharge, 2 = thick purulent discharge)    Nasopharynx:     Eustachean tube orifices, Fossae of Rosenmuller and posterior nasopharyngeal wall obstructed due to nasopharyngeal mass versus enlarged adenoids    Septum: intact and deviated to the left of the mid septum  Other: The inferior and middle turbinates were examined. The middle meatus, and sphenoethmoid recess was examined bilaterally. Cultures were not obtained from the nasal passage. There were no complications. Tolerated well without complication. I attest that I was present for and did the entire procedure myself. Eliu Diaz MD  12/1/22    Portions of this note were dictated using Dragon.  There may be linguistic errors secondary to the use of this program.

## 2022-12-01 NOTE — PROGRESS NOTES
Tex Osei   1990, 27 y.o. male   [id number]       Referring Provider: Sia Baker MD  Referral Type: In an order in [address] [place] [address]    Reason for Visit: Evaluation of the cause of disorders of hearing, tinnitus, or balance. ADULT AUDIOLOGIC EVALUATION      Tex Osei is a 27 y.o. male seen today, 12/1/2022 , for an initial audiologic evaluation. Patient was seen by Sia Baker MD following today's evaluation. AUDIOLOGIC AND OTHER PERTINENT MEDICAL HISTORY:      Tex Osei noted decreased hearing, bilaterally since May 2022. Patient also reports history of occupational noise exposure through . No additional significant otologic or medical history was reported. Tex Osei denied aural fullness, otorrhea, tinnitus, dizziness, imbalance, history of falls, history of head trauma, history of ear surgery, and family history of hearing loss. Date: 12/1/2022     IMPRESSIONS:      Today's results revealed a symmetric mixed conductive and sensorineural hearing loss with excellent word recognition, bilaterally. Air-bone gaps > 10 dBHL noted form 500-4000Hz, bilaterally. Follow medical recommendations of Sia Baker MD.     ASSESSMENT AND FINDINGS:     Otoscopy revealed: Clear ear canals bilaterally    RIGHT EAR:  Hearing Sensitivity: Moderate to moderately-severe mixed conductive and sensorineural hearing loss. Speech Recognition Threshold: 45 dB HL  Word Recognition: Excellent 100%, based on NU-6 25-word list at 85 dBHL masked using recorded speech stimuli. Tympanometry: Flat, no peak pressure or compliance, Type B tympanogram, with typical ear canal volume, consistent with middle ear fluid. LEFT EAR:  Hearing Sensitivity: Moderate to moderately-severe mixed conductive and sensorineural hearing loss. Speech Recognition Threshold: 40 dB HL  Word Recognition: Excellent 100%, based on NU-6 25-word list at 80 dBHL masked using recorded speech stimuli. Tympanometry: Flat, no peak pressure or compliance, Type B tympanogram, with typical ear canal volume, consistent with middle ear fluid. Reliability: Good   Transducer: Inserts    See scanned audiogram dated 12/1/2022  for results. PATIENT EDUCATION:       The following items were discussed with the patient:   - Good Communication Strategies  - Noise-Induced Hearing Loss and use of Hearing Protection Devices (HPDs)     Educational information was shared in the After Visit Summary. RECOMMENDATIONS:                                                                                                                                                                                                                                                            The following items are recommended based on patient report and results from today's appointment:   - Continue medical follow-up with Mimi Dorman MD.   - Retest hearing as medically indicated and/or sooner if a change in hearing is noted. - Utilize \"Good Communication Strategies\" as discussed to assist in speech understanding with communication partners. - Use hearing protection devices (HPDs), such as protective ear muffs and ear plugs, when exposed to dangerous sound levels.        Madhvai Peng  Audiologist    Chart CC'd to: Mimi Dorman MD      Degree of   Hearing Sensitivity dB Range   Within Normal Limits (WNL) 0 - 20   Mild 20 - 40   Moderate 40 - 55   Moderately-Severe 55 - 70   Severe 70 - 90   Profound 90 +

## 2022-12-01 NOTE — PATIENT INSTRUCTIONS
-Obtain CT scan   -Start flonase twice daily with chin to chest positioning  -Pops ears 4-5 times daily    We will call you with results of scan

## 2022-12-05 ENCOUNTER — TELEPHONE (OUTPATIENT)
Dept: ENT CLINIC | Age: 32
End: 2022-12-05

## 2022-12-05 DIAGNOSIS — J35.2 ADENOID HYPERTROPHY: Primary | ICD-10-CM

## 2022-12-06 ENCOUNTER — HOSPITAL ENCOUNTER (OUTPATIENT)
Age: 32
Discharge: HOME | End: 2022-12-06
Payer: COMMERCIAL

## 2022-12-06 ENCOUNTER — HOSPITAL ENCOUNTER (OUTPATIENT)
Dept: CT IMAGING | Age: 32
Discharge: HOME | End: 2022-12-06
Payer: COMMERCIAL

## 2022-12-06 DIAGNOSIS — J39.2 NASOPHARYNGEAL MASS: ICD-10-CM

## 2022-12-06 DIAGNOSIS — J35.2 ADENOID HYPERTROPHY: ICD-10-CM

## 2022-12-06 LAB
BUN BLDV-MCNC: 11 MG/DL (ref 7–20)
CREAT SERPL-MCNC: 0.8 MG/DL (ref 0.9–1.3)
GFR SERPL CREATININE-BSD FRML MDRD: >60 ML/MIN/{1.73_M2}

## 2022-12-06 PROCEDURE — 6360000004 HC RX CONTRAST MEDICATION: Performed by: OTOLARYNGOLOGY

## 2022-12-06 PROCEDURE — 84520 ASSAY OF UREA NITROGEN: CPT

## 2022-12-06 PROCEDURE — 36415 COLL VENOUS BLD VENIPUNCTURE: CPT

## 2022-12-06 PROCEDURE — 70487 CT MAXILLOFACIAL W/DYE: CPT

## 2022-12-06 PROCEDURE — 82565 ASSAY OF CREATININE: CPT

## 2022-12-06 RX ADMIN — IOPAMIDOL 75 ML: 755 INJECTION, SOLUTION INTRAVENOUS at 18:02

## 2022-12-09 ENCOUNTER — TELEPHONE (OUTPATIENT)
Dept: OTOLARYNGOLOGY | Age: 32
End: 2022-12-09

## 2022-12-09 NOTE — TELEPHONE ENCOUNTER
Reviewed CT imaging with patient - enlarged adenoids, left paranasal sinus opacifications. He states he had an episode of bilateral clear drainage without pain, but ears are still full/clogged. Discussed sinus rinses however he opted to continue with only flonase; has cut down on 1PPD smoking (but not quantified). Will see him back in 3 weeks.

## 2022-12-29 ENCOUNTER — OFFICE VISIT (OUTPATIENT)
Dept: ENT CLINIC | Age: 32
End: 2022-12-29
Payer: COMMERCIAL

## 2022-12-29 ENCOUNTER — PROCEDURE VISIT (OUTPATIENT)
Dept: AUDIOLOGY | Age: 32
End: 2022-12-29
Payer: COMMERCIAL

## 2022-12-29 VITALS
HEART RATE: 88 BPM | WEIGHT: 270 LBS | BODY MASS INDEX: 42.38 KG/M2 | DIASTOLIC BLOOD PRESSURE: 91 MMHG | RESPIRATION RATE: 16 BRPM | TEMPERATURE: 98.6 F | HEIGHT: 67 IN | SYSTOLIC BLOOD PRESSURE: 147 MMHG

## 2022-12-29 DIAGNOSIS — H65.93 MIDDLE EAR EFFUSION, BILATERAL: Primary | ICD-10-CM

## 2022-12-29 DIAGNOSIS — H90.6 MIXED CONDUCTIVE AND SENSORINEURAL HEARING LOSS OF BOTH EARS: Primary | ICD-10-CM

## 2022-12-29 DIAGNOSIS — J35.2 ADENOID HYPERTROPHY: ICD-10-CM

## 2022-12-29 PROCEDURE — 92557 COMPREHENSIVE HEARING TEST: CPT | Performed by: AUDIOLOGIST

## 2022-12-29 PROCEDURE — 99213 OFFICE O/P EST LOW 20 MIN: CPT | Performed by: OTOLARYNGOLOGY

## 2022-12-29 PROCEDURE — 92567 TYMPANOMETRY: CPT | Performed by: AUDIOLOGIST

## 2022-12-29 ASSESSMENT — ENCOUNTER SYMPTOMS
WHEEZING: 0
ABDOMINAL PAIN: 0
SHORTNESS OF BREATH: 0

## 2022-12-29 NOTE — LETTER
[facility]    Surgery Schedule Request Form: 12/29/22  [address]  DATE OF SURGERY: 1/27/2023  TIME OF SURGERY:  1:00            CONF #: ____________________   Patient Information:  Patient name: Lennox Hemming    YOB: 1990 Age/Sex:32 y.o./male    SS #:[id number]    Wt Readings from Last 1 Encounters:   12/29/22 270 lb (122.5 kg)       Telephone Information:   Mobile [phone]     Home [phone]     Surgeon & Procedure Information:   Lead surgeon: Zoey Knott Co-Surgeon: key  Phone: [phone] Fax: [phone]  PCP: No primary care provider on file. Diagnosis: Bilateral middle ear effusion  H65.93    Location: [address] [place] [address]    Procedure name/CPT: Bilateral tympanostomy tube placement, adenoidectomy 56582, 12042    Procedure length: 1 hour Anesthesia: General    Special Equipment: yes - airway tower, operating microscope, Stuart tympanostomy tubes, sinus tray (will perform adenoidectomy through the nose)    Patient Status: SDS (OP)    COVID Vacs: yes / no     Primary Payor Plan: BC/BS  Member ID: [id number] Cartwright name: Lennox Hemming    [] Implement attached clinical orders for patient.       Electronically signed by Maria C Sharpe MD on 12/29/2022 at 12:15 PM

## 2022-12-29 NOTE — PROGRESS NOTES
Kumar Sequeira   1990, 27 y.o. male   [id number]       Referring Provider: Mimi Merino MD  Referral Type: In an order in [address] [place] [address]    Reason for Visit: Determination of the effect of medication, surgery, or other treatment. ADULT AUDIOLOGIC EVALUATION      Kumar Sequeira is a 27 y.o. male seen today, 12/29/2022 , for a recheck audiologic evaluation. Patient was seen by Mimi Merino MD following today's evaluation. AUDIOLOGIC AND OTHER PERTINENT MEDICAL HISTORY:      Kumar Sequeira noted declined hearing with left ear worse than right. Per previous evaluation, he notes decreased hearing since May 2022 and history of occupational noise exposure. No additional changes to otologic or medical history was reported. Kumar Sequeira denied aural fullness, otorrhea, tinnitus, dizziness, imbalance, history of falls, history of head trauma, history of ear surgery, and family history of hearing loss. Date: 12/29/2022     IMPRESSIONS:      Today's results revealed a symmetric mixed conductive and sensorineural hearing loss with excellent word recognition, bilaterally. Hearing stable compared to previous audiogram. Follow medical recommendations of Mimi Merino MD.     ASSESSMENT AND FINDINGS:     Otoscopy revealed: Clear ear canals bilaterally    RIGHT EAR:  Hearing Sensitivity: Moderate sloping to moderately-severe mixed conductive and sensorineural hearing loss. Speech Recognition Threshold: 45 dB HL  Word Recognition: Excellent 100%, based on NU-6 25-word list at 85 dBHL masked using recorded speech stimuli. Tympanometry: Flat, no peak pressure or compliance, Type B tympanogram, with typical ear canal volume, consistent with middle ear fluid. LEFT EAR:  Hearing Sensitivity: Moderate sloping to moderately-severe mixed conductive and sensorineural hearing loss.    Speech Recognition Threshold: 45 dB HL  Word Recognition: Excellent 100%, based on NU-6 25-word list at 85 dBHL masked using recorded speech stimuli. Tympanometry: Flat, no peak pressure or compliance, Type B tympanogram, with large ear canal volume, consistent with patent PE tube/TM perforation. Reliability: Good   Transducer: Inserts    See scanned audiogram dated 12/29/2022  for results. PATIENT EDUCATION:       The following items were discussed with the patient:   - Good Communication Strategies  - Noise-Induced Hearing Loss and use of Hearing Protection Devices (HPDs)     Educational information was shared in the After Visit Summary. RECOMMENDATIONS:                                                                                                                                                                                                                                                            The following items are recommended based on patient report and results from today's appointment:   - Continue medical follow-up with Gaurav Reese MD.   - Retest hearing as medically indicated and/or sooner if a change in hearing is noted. - Utilize \"Good Communication Strategies\" as discussed to assist in speech understanding with communication partners. - Use hearing protection devices (HPDs), such as protective ear muffs and ear plugs, when exposed to dangerous sound levels.        Madhavi Fortune  Audiologist    Chart CC'd to: Gaurav Reese MD      Degree of   Hearing Sensitivity dB Range   Within Normal Limits (WNL) 0 - 20   Mild 20 - 40   Moderate 40 - 55   Moderately-Severe 55 - 70   Severe 70 - 90   Profound 90 +

## 2022-12-29 NOTE — PROGRESS NOTES
[facility] [address] [place] [address], [address] [place] [address], [address] [place] [address]  P: [phone]       Patient     Samy Galicia  1990    Chief Concern     Chief Complaint   Patient presents with    Follow-up     Patient states that he is here for a follow up. He states that he is still having trouble hearing. Assessment and Plan      Diagnosis Orders   1. Middle ear effusion, bilateral        2. Adenoid hypertrophy          Patient with concern for hearing loss since 06/2022, bilateral middle ear effusions noted. He is a smoker, with no recent fevers, chills, night sweats or epistaxis, but on nasal examination he has nasal edema, and obstructing adenoids in the nasopharynx-CT imaging without bony erosion. Start him on Flonase twice daily, and he has quit smoking and is currently vaping. Despite this, he continues to have bilateral middle ear effusions and enlarged adenoids. Given the above history and the patient's desire for surgery, they are is a candidate for adenoidectomy and bilateral tympanostomy tube placed  -Risks and benefits of the above procedure include pain, dysphagia, inflammation, hemorrhage requiring operative management, dysgeusia/decreased sensation to the ipsilateral tongue, velopharyngeal insufficiency with possible hypernasal speech or nasal regurgitation of food/liquids.  -Tympanostomy tube placement carries risks of middle ear foreign body, tube extrusion, residual tympanic membrane perforation, inflammation, otorrhea, hearing loss  -General anesthesia carries its own risks, which will be discussed with the Anesthesiology team on day of surgery  -The patient will need to see their primary care physician for medical clearance    No follow-ups on file. History of Present Illness     27 y.o. male with gradual bilateral hearing loss since 06/2022, no head trauma, illness, barotrauma.  Began in the right ear, now bilateral. Bilateral ear fullness, tinnitus (constant ringing, pulsatile), no vertigo, no otalgia, no otorrhea. No family history of early hearing loss. No history of chronic ear infections or tympanostomy tubes. Works as a  (employed). Current 1 pack/day smoker, no recent fevers, chills, night sweats, epistaxis    Interval history 12/09/2022: Believes ear fullness has slightly improved but continues to be present, continues to have bilateral tinnitus but diminished    Past Medical History     Past Medical History:   Diagnosis Date    Asthma     Hearing loss     Hyperlipidemia     Hypertension     Tinnitus        Past Surgical History     No past surgical history on file. Family History     Family History   Problem Relation Age of Onset    Asthma Father     Heart Disease Father     High Blood Pressure Father     High Cholesterol Father     Asthma Brother        Social History     Social History     Tobacco Use    Smoking status: Every Day     Packs/day: 1.00     Types: Cigarettes    Smokeless tobacco: Never   Vaping Use    Vaping Use: Never used   Substance Use Topics    Alcohol use: No    Drug use: Not Currently        Allergies     No Known Allergies    Medications     Current Outpatient Medications   Medication Sig Dispense Refill    atorvastatin (LIPITOR) 80 MG tablet       escitalopram (LEXAPRO) 10 MG tablet       metFORMIN (GLUCOPHAGE) 500 MG tablet       Icosapent Ethyl (VASCEPA) 1 g CAPS capsule       fluticasone (FLONASE) 50 MCG/ACT nasal spray 1 spray by Each Nostril route 2 times daily 16 g 2    lisinopril (PRINIVIL;ZESTRIL) 20 MG tablet Take 20 mg by mouth daily       No current facility-administered medications for this visit. Review of Systems     Review of Systems   Constitutional:  Negative for activity change, chills, diaphoresis, fatigue and fever. HENT:  Positive for hearing loss and tinnitus. Negative for congestion, ear discharge and ear pain. Eyes:  Negative for visual disturbance.    Respiratory:  Negative for shortness of breath and wheezing. Cardiovascular:  Negative for chest pain. Gastrointestinal:  Negative for abdominal pain. Musculoskeletal:  Negative for neck pain and neck stiffness. Skin:  Negative for rash. Neurological:  Negative for dizziness, light-headedness and headaches. Hematological:  Negative for adenopathy. PhysicalExam     Vitals:    12/29/22 0944   BP: (!) 147/91   Site: Left Lower Arm   Position: Sitting   Cuff Size: Medium Adult   Pulse: 88   Resp: 16   Temp: 98.6 °F (37 °C)   TempSrc: Infrared   Weight: 270 lb (122.5 kg)   Height: 5' 7\" (1.702 m)       Physical Exam  Vitals reviewed. Constitutional:       Appearance: Normal appearance. HENT:      Head: Normocephalic and atraumatic. Right Ear: Ear canal and external ear normal. A middle ear effusion is present. There is no impacted cerumen. Left Ear: Ear canal and external ear normal. A middle ear effusion is present. There is no impacted cerumen. Nose: No congestion or rhinorrhea. Mouth/Throat:      Lips: Pink. No lesions. Mouth: Mucous membranes are moist. No oral lesions. Tongue: No lesions. Tongue does not deviate from midline. Palate: No mass and lesions. Pharynx: Oropharynx is clear. Uvula midline. No oropharyngeal exudate or posterior oropharyngeal erythema. Eyes:      Extraocular Movements: Extraocular movements intact. Pupils: Pupils are equal, round, and reactive to light. Musculoskeletal:      Cervical back: Normal range of motion and neck supple. Lymphadenopathy:      Cervical: No cervical adenopathy. Neurological:      Mental Status: He is alert. Cranial Nerves: No cranial nerve deficit. Data Review        Procedure     Nasal Endoscopy    Pre OP: Bilateral middle ear effusion, tobacco use, rule out nasopharyngeal mass  Post OP: Rhinitis, enlarged adenoids versus nasopharyngeal mass    Verbal consent was received.  After topical anesthesia and decongestion had been obtained using aerosolized 1% lidocaine and oxymetazoline, a 45 degree rigid endoscope was placed into both nares with the patient in a sitting position. The following was observed:    Right Nasal Cavity and Paranasal Sinuses:  Polyp score = 0 (0 = no polyps, 1 = small polyps in middle meatus not reaching below the inferior border of the middle edmund, 2 = polyps reaching below the middle border of the middle turbinate, 3= large polyps reaching the lower border of the inferior turbinate or polyps medial to the middle edmund, 4= large polyps causing almost complete congestion/obstruction of the interior meatus)  Edema score = 1 (0 = absent, 1 = mild, 2 = severe)  Discharge score = 1 (0 = no discharge, 1 = clear thin discharge, 2 = thick purulent discharge)    Left Nasal Cavity and Paranasal Sinuses:    Polyp score = 0 (0 = no polyps, 1 = small polyps in middle meatus not reaching below the inferior border of the middle edmund, 2 = polyps reaching below the middle border of the middle turbinate, 3= large polyps reaching the lower border of the inferior turbinate or polyps medial to the middle edmund, 4= large polyps causing almost complete congestion/obstruction of the interior meatus)  Edema score = 1 (0 = absent, 1 = mild, 2 = severe)  Discharge score = 1 (0 = no discharge, 1 = clear thin discharge, 2 = thick purulent discharge)    Nasopharynx:     Eustachean tube orifices, Fossae of Rosenmuller and posterior nasopharyngeal wall obstructed due to enlarged adenoids -slight improvement in size but continues to obstruct the eustachian tube orifices    Septum: intact and deviated to the left of the mid septum  Other: The inferior and middle turbinates were examined. The middle meatus, and sphenoethmoid recess was examined bilaterally. Cultures were not obtained from the nasal passage. There were no complications. Tolerated well without complication.  I attest that I was present for and did the entire procedure myself. Lyndsey Askew MD  12/29/22    Portions of this note were dictated using Dragon.  There may be linguistic errors secondary to the use of this program.

## 2022-12-29 NOTE — PATIENT INSTRUCTIONS
Good Communication Strategies    Communication can be challenging for anyone, but can be especially difficult for those with some degree of hearing loss. While we may not be able to control every factor that may lead to difficulty with communication, there are Good Communication Strategies that we can all use in our day-to-day lives. Communication takes both parties working together for it to be successful. Tips as a Listener:   Control your environment. It is important to limit the amount of background noise in the room when possible. You should also consider having a good light source in the room to best see the other person. Ask for clarification. Instead of saying \"What?\", you can use parts of what you heard to make a new question. For example, if you heard the word \"Thursday\" but not the rest of the week, you may ask \"What was that about Thursday? \" or \"What did you want to do Thursday? \". This shows the person talking that you are listening and will help them better explain what they are saying. Be an advocate for yourself. If you are hearing but not understanding, tell the other person \"I can hear you, but I need you to slow down when you speak. \"  Or if someone is facing the other direction, say \"I cannot hear you when you are not looking at me when we talk. \"       Tips as a Talker:   - Sit or stand 3 to 6 feet away to maximize audibility         -- It is unrealistic to believe someone else will fully hear your message if you are speaking from across the room or in a different room in the house   - Stay at eye level to help with visual cues   - Make sure you have the persons attention before speaking   - Use facial expressions and gestures to accentuate your message   - Raise your voice slightly (do not scream)   - Speak slowly and distinctly   - Use short, simple sentences   - Rephrase your words if the person is having a hard time understanding you    - To avoid distortion, dont speak directly into a persons ear      Some additional items that may be helpful:   - Remain patient - this is important for both parties   - Write down items that still cannot be heard/understood. You may write with pen/paper or consider typing/texting on a cell phone or smart device. - If background noise is unavoidable, try to keep yourself in a good position in the room. By sitting at a mckenzie on the side of the restaurant (preferably a corner), it will be easier to communicate than if you were sitting at a table in the middle with background noise surrounding you. Keep yourself positioned away from music speakers or heavy foot traffic. Noise-Induced Hearing Loss  What it is, and what you can do to prevent it      Exposure to loud sounds, in an occupational setting or recreational, can cause permanent hearing loss. Sound is measured in decibels (dB). Noise-induced hearing loss is the ONLY type of preventable hearing loss. Hearing loss related to noise exposure can occur at any age. There are small sensory cells, called inner and outer hair cells, within the inner ear (cochlea). These cells process the loudness (intensity) and pitch (frequency) of sound and send the signal to the brain via our auditory nerve (vestibulocochlear nerve, cranial nerve VIII). When these cells are damaged, they can result in permanent hearing loss and/or tinnitus. The hair cells responsible for high frequency sounds, like birds chirping, are most likely to be damaged due to loud sounds. The high frequency sounds are also very important for our clarity and understanding of speech. OCCUPATIONAL NOISE EXPOSURE RECREATIONAL NOISE EXPOSURE   Some jobs may have exposure to loud sounds in the workplace. These jobs may include but are not limited to:  [address] settings  Manufacturing  Construction  [employer]  Landscaping  Hairdressing/hairstyling  [address] [facility]   . .. And more!  Many activities outside of work may cause permanent hearing loss. These activities may include but are not limited to:  Lawnmowers, leaf blowers  Farming equipment and animals (such as pigs squealing)  Chainsaws and other power tools  Playing musical instruments and/or singing  Listening to music too loudly - at concerts, through stereo, through ear buds or headphones  Attending sporting events  Attending fireworks shows or using fireworks at home  Use of firearms  . .. And more! REDUCE OR PROTECT YOUR EARS FROM NOISE EXPOSURE    To do your best to avoid noise-induced hearing loss, here are some tips:  Limit exposure to loud sounds. 85 dB (decibels) is safe for 8 hours. As sounds are louder, the length of time the sound is safe lessens. These numbers are cumulative across a 24-hour period. (NIOSH and CDC, 2002)  85 dB is safe for 8 hours  88 dB is safe for 4 hours  91 dB is safe for 2 hours  94 dB is safe for 1 hour  97 dB is safe for 30 minutes  100 dB is safe for 15 minutes  103 dB is safe for 7.5 minutes  106 dB is safe for 3.75 minutes  109 dB is safe for LESS THAN 2 minutes  112 dB is safe for LESS THAN 1 minute  115 dB is safe for ~ 30 seconds  130 dB can cause IMMEDIATE hearing loss  If you are unsure if a sound is too loud, consider checking the sound level with a \"sound level meter\". There are apps on smart devices that can measure the loudness of the sound. They are not as accurate as expensive equipment used by scientists, but it will give you a guesstimate of how loud the sound is, and if it may be damaging to your hearing. If you cannot avoid loud sounds, here are ways to reduce your exposure:  1. Wear hearing protection  Ear plugs and protective ear muffs can be used to reduce the intensity of the sound. The higher the NRR (noise reduction rating), the better reduction of the intensity of the sound   2.  Turn the volume down  When listening to music, turn the volume down, especially when wearing ear buds or headphones. A good rule of thumb is to not go beyond the middle setting on your device. If you can't hear someone talking to you from arm's length away, your music may be at a level that it can cause damage. If someone else can hear your music from 3 feet away, it may also be at a level that it can cause damage. 3. Walk away from the sound  If you do not have the ability to wear hearing protection or turn down the volume of the sound, you should do your best to move away from the source of the sound. Sound decreases in intensity as we move further from the source. The sound will decrease by 6 dB for every doubling of distance from the sound source. Exposure to these sounds may cause permanent damage to your hearing.   If you suspect your hearing has changed, it is recommended that you have your hearing tested by your audiologist.

## 2022-12-30 ENCOUNTER — TELEPHONE (OUTPATIENT)
Dept: ENT CLINIC | Age: 32
End: 2022-12-30

## 2023-01-05 ENCOUNTER — TELEPHONE (OUTPATIENT)
Dept: ENT CLINIC | Age: 33
End: 2023-01-05

## 2023-01-05 NOTE — TELEPHONE ENCOUNTER
LVM for patient on 12//30, 1/3 and 1/5. Needing to advise him ENT surgery on 1/27/23 will be at [facility] and at 2:30. Place and time has changed.

## 2023-01-06 NOTE — PROGRESS NOTES
Green Aydin    Age 27 y.o.    male    1990    MRN [id number]    1/27/2023  Arrival Time_____________  OR Time____________[address]     Procedure(s):  BILATERAL TYMPANOSTOMY, ADENOIDECTOMY                      General   Surgeon(s):  Debi Calix, MD      DAY ADMIT ___  SDS/OP ___  OUTPT IN BED ___        Phone [phone] (home)     PCP _____________________ Phone_________________ Epic ( ) Epic CE ( ) Appt ________    ADDITIONAL INFO __________________________________ Cardio/Consult _____________    NOTES _____________________________________________________________________    ____________________________________________________________________________    PAT APPT DATE:________ TIME: ________  FAXED QAD: _______  (__) H&P w/ Hospitalist  __________________________________________________________________________  Preop Nurse phone screen complete: _____________  (__) CBC     (__) W/ DIFF ___________     (__) Hgb A1C    ___________  (__) CHEST X RAY   __________  (__) LIPID PROFILE  ___________  (__) EKG   __________  (__) PT/PTT   ___________  (__) PFT's   __________  (__) BMP   ___________  (__) CAROTIDS  __________  (__) CMP   ___________  (__) VEIN MAPPING  __________  (__) U/A   ___________  (__) HISTORY & PHYSICAL __________  (__) URINE C & S  ___________  (__) CARDIAC CLEARANCE __________  (__) U/A W/ FLEX  ___________  (__) PULM.  CLEARANCE __________  (__) SERUM PREGNANCY ___________  (__) Check Epic DOS orders __________  (__) TYPE & SCREEN __________repeat ( ) (__)  __________________ __________  (__) ALBUMIN   ___________  (__)  __________________ __________  (__) TRANSFERRIN  ___________  (__)  __________________ __________  (__) LIVER PROFILE  ___________  (__)  __________________ __________  (__) MRSA NASAL SWAB ___________  (__) URINE PREG DOS __________  (__) SED RATE  ___________  (__) BLOOD SUGAR DOS __________  (__) C-REACTIVE PROTEIN ___________    (__) VITAMIN D HYDROXY ___________  (__) BLOOD THINNERS __________    (__) ACE/ ARBS: _____________________     (__) BETABLOCKERS __________________

## 2023-01-24 NOTE — PROGRESS NOTES
1. Do not eat or drink anything after 12 midnight prior to surgery. This includes no water, chewing gum mints, or ice chips. You may brush your teeth and gargle the day of surgery but DO NOT SWALLOW THE WATER. 2. Please see your family doctor/pediatrician for a history and physical and/or concerning medications. Bring any test results/reports from your physician's office. If you are under the care of a heart doctor or specialist please be aware that you may be asked to see him or her for clearance. 3. You may be asked to stop blood thinners such as Coumadin, Plavix, Fragmin, and Lovenox or Anti-inflammatories such as Aspirin, Ibuprofen, Advil, and Naproxen prior to your surgery. Please check with your doctor before stopping these or any other medications. 4. Do not smoke, and do not drink any alcoholic beverages 24 hours prior to surgery. 5. You MUST make arrangements for a responsible adult to take you home after your surgery. For your safety, you will not be allowed to leave alone or drive yourself home. Your surgery will be cancelled if you do not have a ride home. Also for your safety, it is strongly suggested someone stay with you the first 24 hrs after your surgery. 6. A parent/legal guardian must accompany a child scheduled for surgery and plan to stay at the hospital until the child is discharged. Please do not bring other children with you. 7. For your comfort,please wear simple, loose fitting clothing to the hospital.  Please do not bring valuables (money, credit cards, checkbooks, etc.) Do not wear any makeup (including no eye makeup) or nail polish on your fingers or toes. 8. For your safety, please DO NOT wear any jewelry or piercings on day of surgery. All body piercing jewelry must be removed. 9. If you have dentures, they will be removed before going to the OR; for your convenience we will provide you with a container.   If you wear contact lenses or glasses, they will be removed, they will be removed, please bring a case for them. 10. If appicable,Please see your family doctor/pediatrician for a history & physical and/or concerning medications. Bring any test results/reports from your physician's office. 11. Remember to bring Blood Bank bracelet to the hospital on the day of surgery. 12. If you have a Living Will and Durable Power of  for Healthcare, please bring in a copy. 15. Notify your Surgeon if you develop any illness between now and surgery  time, cough, cold, fever, sore throat, nausea, vomiting, etc.  Please notify your surgeon if you experience dizziness, shortness of breath or blurred vision between now & the time of your surgery   14. DO NOT shave your operative site 96 hours prior to surgery. For face & neck surgery, men may use an electric razor 48 hours prior to surgery. 15. Shower the night before surgery with __X_Antibacterial soap ___Hibiclens   16. To provide excellent care visitors will be limited to one in the room at any given time. 17.  Please bring picture ID and insurance card. 18.  Visit our web site for additional information:  [facility] Card/surgery.           INSTRUCTED TO HOLD LISINOPRIL 24 HRS PRIOR TO SURGERY AND NOT TO TAKE METFORMIN DOS - PER ANESTHESIA REQUEST

## 2023-01-26 ENCOUNTER — TELEPHONE (OUTPATIENT)
Dept: ENT CLINIC | Age: 33
End: 2023-01-26

## 2023-01-27 ENCOUNTER — HOSPITAL ENCOUNTER (OUTPATIENT)
Age: 33
Setting detail: OUTPATIENT SURGERY
Discharge: HOME | End: 2023-01-27
Attending: OTOLARYNGOLOGY | Admitting: OTOLARYNGOLOGY
Payer: COMMERCIAL

## 2023-01-27 ENCOUNTER — ANESTHESIA EVENT (OUTPATIENT)
Dept: OPERATING ROOM | Age: 33
End: 2023-01-27
Payer: COMMERCIAL

## 2023-01-27 ENCOUNTER — ANESTHESIA (OUTPATIENT)
Dept: OPERATING ROOM | Age: 33
End: 2023-01-27
Payer: COMMERCIAL

## 2023-01-27 VITALS
WEIGHT: 260 LBS | DIASTOLIC BLOOD PRESSURE: 70 MMHG | RESPIRATION RATE: 13 BRPM | HEART RATE: 82 BPM | BODY MASS INDEX: 40.81 KG/M2 | HEIGHT: 67 IN | SYSTOLIC BLOOD PRESSURE: 139 MMHG | OXYGEN SATURATION: 98 % | TEMPERATURE: 96.5 F

## 2023-01-27 DIAGNOSIS — Z90.89 S/P ADENOIDECTOMY: Primary | ICD-10-CM

## 2023-01-27 LAB
GLUCOSE BLD-MCNC: 133 MG/DL (ref 70–99)
GLUCOSE BLD-MCNC: 138 MG/DL (ref 70–99)
PERFORMED ON: ABNORMAL
PERFORMED ON: ABNORMAL

## 2023-01-27 PROCEDURE — 6370000000 HC RX 637 (ALT 250 FOR IP): Performed by: ANESTHESIOLOGY

## 2023-01-27 PROCEDURE — 7100000001 HC PACU RECOVERY - ADDTL 15 MIN: Performed by: OTOLARYNGOLOGY

## 2023-01-27 PROCEDURE — L8699 PROSTHETIC IMPLANT NOS: HCPCS | Performed by: OTOLARYNGOLOGY

## 2023-01-27 PROCEDURE — 3700000000 HC ANESTHESIA ATTENDED CARE: Performed by: OTOLARYNGOLOGY

## 2023-01-27 PROCEDURE — 2580000003 HC RX 258

## 2023-01-27 PROCEDURE — 6360000002 HC RX W HCPCS

## 2023-01-27 PROCEDURE — 7100000011 HC PHASE II RECOVERY - ADDTL 15 MIN: Performed by: OTOLARYNGOLOGY

## 2023-01-27 PROCEDURE — 6370000000 HC RX 637 (ALT 250 FOR IP): Performed by: OTOLARYNGOLOGY

## 2023-01-27 PROCEDURE — A4217 STERILE WATER/SALINE, 500 ML: HCPCS | Performed by: OTOLARYNGOLOGY

## 2023-01-27 PROCEDURE — 3600000004 HC SURGERY LEVEL 4 BASE: Performed by: OTOLARYNGOLOGY

## 2023-01-27 PROCEDURE — 2500000003 HC RX 250 WO HCPCS

## 2023-01-27 PROCEDURE — 7100000000 HC PACU RECOVERY - FIRST 15 MIN: Performed by: OTOLARYNGOLOGY

## 2023-01-27 PROCEDURE — 2580000003 HC RX 258: Performed by: OTOLARYNGOLOGY

## 2023-01-27 PROCEDURE — 3700000001 HC ADD 15 MINUTES (ANESTHESIA): Performed by: OTOLARYNGOLOGY

## 2023-01-27 PROCEDURE — 69436 CREATE EARDRUM OPENING: CPT | Performed by: OTOLARYNGOLOGY

## 2023-01-27 PROCEDURE — 7100000010 HC PHASE II RECOVERY - FIRST 15 MIN: Performed by: OTOLARYNGOLOGY

## 2023-01-27 PROCEDURE — 2720000010 HC SURG SUPPLY STERILE: Performed by: OTOLARYNGOLOGY

## 2023-01-27 PROCEDURE — 42831 REMOVAL OF ADENOIDS: CPT | Performed by: OTOLARYNGOLOGY

## 2023-01-27 PROCEDURE — 2709999900 HC NON-CHARGEABLE SUPPLY: Performed by: OTOLARYNGOLOGY

## 2023-01-27 PROCEDURE — 3600000014 HC SURGERY LEVEL 4 ADDTL 15MIN: Performed by: OTOLARYNGOLOGY

## 2023-01-27 RX ORDER — IBUPROFEN 600 MG/1
600 TABLET ORAL 4 TIMES DAILY PRN
Qty: 120 TABLET | Refills: 0 | Status: SHIPPED | OUTPATIENT
Start: 2023-01-27

## 2023-01-27 RX ORDER — OXYCODONE HYDROCHLORIDE 5 MG/1
5 TABLET ORAL PRN
Status: COMPLETED | OUTPATIENT
Start: 2023-01-27 | End: 2023-01-27

## 2023-01-27 RX ORDER — MIDAZOLAM HYDROCHLORIDE 1 MG/ML
INJECTION INTRAMUSCULAR; INTRAVENOUS PRN
Status: DISCONTINUED | OUTPATIENT
Start: 2023-01-27 | End: 2023-01-27 | Stop reason: SDUPTHER

## 2023-01-27 RX ORDER — SODIUM CHLORIDE, SODIUM LACTATE, POTASSIUM CHLORIDE, CALCIUM CHLORIDE 600; 310; 30; 20 MG/100ML; MG/100ML; MG/100ML; MG/100ML
INJECTION, SOLUTION INTRAVENOUS CONTINUOUS PRN
Status: DISCONTINUED | OUTPATIENT
Start: 2023-01-27 | End: 2023-01-27 | Stop reason: SDUPTHER

## 2023-01-27 RX ORDER — LIDOCAINE HYDROCHLORIDE 10 MG/ML
2 INJECTION, SOLUTION INFILTRATION; PERINEURAL
Status: DISCONTINUED | OUTPATIENT
Start: 2023-01-27 | End: 2023-01-27 | Stop reason: HOSPADM

## 2023-01-27 RX ORDER — SODIUM CHLORIDE 0.9 % (FLUSH) 0.9 %
5-40 SYRINGE (ML) INJECTION EVERY 12 HOURS SCHEDULED
Status: DISCONTINUED | OUTPATIENT
Start: 2023-01-27 | End: 2023-01-27 | Stop reason: HOSPADM

## 2023-01-27 RX ORDER — OXYCODONE HYDROCHLORIDE 5 MG/1
5 TABLET ORAL EVERY 6 HOURS PRN
Qty: 12 TABLET | Refills: 0 | Status: SHIPPED | OUTPATIENT
Start: 2023-01-27 | End: 2023-01-30

## 2023-01-27 RX ORDER — SODIUM CHLORIDE 0.9 % (FLUSH) 0.9 %
5-40 SYRINGE (ML) INJECTION PRN
Status: DISCONTINUED | OUTPATIENT
Start: 2023-01-27 | End: 2023-01-27 | Stop reason: HOSPADM

## 2023-01-27 RX ORDER — DEXAMETHASONE SODIUM PHOSPHATE 4 MG/ML
INJECTION, SOLUTION INTRA-ARTICULAR; INTRALESIONAL; INTRAMUSCULAR; INTRAVENOUS; SOFT TISSUE PRN
Status: DISCONTINUED | OUTPATIENT
Start: 2023-01-27 | End: 2023-01-27 | Stop reason: SDUPTHER

## 2023-01-27 RX ORDER — CIPROFLOXACIN AND DEXAMETHASONE 3; 1 MG/ML; MG/ML
4 SUSPENSION/ DROPS AURICULAR (OTIC) 2 TIMES DAILY
Status: DISCONTINUED | OUTPATIENT
Start: 2023-01-27 | End: 2023-01-27 | Stop reason: CLARIF

## 2023-01-27 RX ORDER — FENTANYL CITRATE 50 UG/ML
INJECTION, SOLUTION INTRAMUSCULAR; INTRAVENOUS PRN
Status: DISCONTINUED | OUTPATIENT
Start: 2023-01-27 | End: 2023-01-27 | Stop reason: SDUPTHER

## 2023-01-27 RX ORDER — FAMOTIDINE 10 MG/ML
20 INJECTION, SOLUTION INTRAVENOUS ONCE
Status: DISCONTINUED | OUTPATIENT
Start: 2023-01-27 | End: 2023-01-27 | Stop reason: HOSPADM

## 2023-01-27 RX ORDER — CIPROFLOXACIN HYDROCHLORIDE 3.5 MG/ML
4 SOLUTION/ DROPS TOPICAL 2 TIMES DAILY
Status: DISCONTINUED | OUTPATIENT
Start: 2023-01-27 | End: 2023-01-27 | Stop reason: HOSPADM

## 2023-01-27 RX ORDER — ROCURONIUM BROMIDE 10 MG/ML
INJECTION, SOLUTION INTRAVENOUS PRN
Status: DISCONTINUED | OUTPATIENT
Start: 2023-01-27 | End: 2023-01-27 | Stop reason: SDUPTHER

## 2023-01-27 RX ORDER — DEXAMETHASONE SODIUM PHOSPHATE 1 MG/ML
4 SOLUTION/ DROPS OPHTHALMIC 2 TIMES DAILY
Status: DISCONTINUED | OUTPATIENT
Start: 2023-01-27 | End: 2023-01-27 | Stop reason: HOSPADM

## 2023-01-27 RX ORDER — OXYMETAZOLINE HYDROCHLORIDE 0.05 G/100ML
SPRAY NASAL PRN
Status: DISCONTINUED | OUTPATIENT
Start: 2023-01-27 | End: 2023-01-27 | Stop reason: HOSPADM

## 2023-01-27 RX ORDER — CIPROFLOXACIN HYDROCHLORIDE 3.5 MG/ML
SOLUTION/ DROPS TOPICAL PRN
Status: DISCONTINUED | OUTPATIENT
Start: 2023-01-27 | End: 2023-01-27 | Stop reason: HOSPADM

## 2023-01-27 RX ORDER — ECHINACEA PURPUREA EXTRACT 125 MG
1 TABLET ORAL
Qty: 60 ML | Refills: 1 | Status: SHIPPED | OUTPATIENT
Start: 2023-01-27 | End: 2023-02-26

## 2023-01-27 RX ORDER — PROPOFOL 10 MG/ML
INJECTION, EMULSION INTRAVENOUS PRN
Status: DISCONTINUED | OUTPATIENT
Start: 2023-01-27 | End: 2023-01-27 | Stop reason: SDUPTHER

## 2023-01-27 RX ORDER — SODIUM CHLORIDE 9 MG/ML
25 INJECTION, SOLUTION INTRAVENOUS PRN
Status: DISCONTINUED | OUTPATIENT
Start: 2023-01-27 | End: 2023-01-27 | Stop reason: HOSPADM

## 2023-01-27 RX ORDER — ONDANSETRON 2 MG/ML
4 INJECTION INTRAMUSCULAR; INTRAVENOUS
Status: DISCONTINUED | OUTPATIENT
Start: 2023-01-27 | End: 2023-01-27 | Stop reason: HOSPADM

## 2023-01-27 RX ORDER — MAGNESIUM HYDROXIDE 1200 MG/15ML
LIQUID ORAL CONTINUOUS PRN
Status: DISCONTINUED | OUTPATIENT
Start: 2023-01-27 | End: 2023-01-27 | Stop reason: HOSPADM

## 2023-01-27 RX ORDER — SODIUM CHLORIDE, SODIUM LACTATE, POTASSIUM CHLORIDE, CALCIUM CHLORIDE 600; 310; 30; 20 MG/100ML; MG/100ML; MG/100ML; MG/100ML
INJECTION, SOLUTION INTRAVENOUS CONTINUOUS
Status: DISCONTINUED | OUTPATIENT
Start: 2023-01-27 | End: 2023-01-27 | Stop reason: HOSPADM

## 2023-01-27 RX ORDER — MEPERIDINE HYDROCHLORIDE 50 MG/ML
12.5 INJECTION INTRAMUSCULAR; INTRAVENOUS; SUBCUTANEOUS EVERY 5 MIN PRN
Status: DISCONTINUED | OUTPATIENT
Start: 2023-01-27 | End: 2023-01-27 | Stop reason: HOSPADM

## 2023-01-27 RX ORDER — OXYCODONE HYDROCHLORIDE 5 MG/1
10 TABLET ORAL PRN
Status: COMPLETED | OUTPATIENT
Start: 2023-01-27 | End: 2023-01-27

## 2023-01-27 RX ORDER — LIDOCAINE HYDROCHLORIDE 20 MG/ML
INJECTION, SOLUTION INFILTRATION; PERINEURAL PRN
Status: DISCONTINUED | OUTPATIENT
Start: 2023-01-27 | End: 2023-01-27 | Stop reason: SDUPTHER

## 2023-01-27 RX ORDER — ONDANSETRON 2 MG/ML
INJECTION INTRAMUSCULAR; INTRAVENOUS PRN
Status: DISCONTINUED | OUTPATIENT
Start: 2023-01-27 | End: 2023-01-27 | Stop reason: SDUPTHER

## 2023-01-27 RX ORDER — ACETAMINOPHEN 500 MG
500 TABLET ORAL 4 TIMES DAILY PRN
Qty: 120 TABLET | Refills: 0 | Status: SHIPPED | OUTPATIENT
Start: 2023-01-27

## 2023-01-27 RX ADMIN — SODIUM CHLORIDE, SODIUM LACTATE, POTASSIUM CHLORIDE, AND CALCIUM CHLORIDE: .6; .31; .03; .02 INJECTION, SOLUTION INTRAVENOUS at 13:12

## 2023-01-27 RX ADMIN — DEXMEDETOMIDINE HYDROCHLORIDE 10 MCG: 100 INJECTION, SOLUTION INTRAVENOUS at 14:16

## 2023-01-27 RX ADMIN — LIDOCAINE HYDROCHLORIDE 100 MG: 20 INJECTION, SOLUTION INFILTRATION; PERINEURAL at 13:53

## 2023-01-27 RX ADMIN — ROCURONIUM BROMIDE 60 MG: 10 SOLUTION INTRAVENOUS at 13:53

## 2023-01-27 RX ADMIN — ONDANSETRON 4 MG: 2 INJECTION INTRAMUSCULAR; INTRAVENOUS at 14:44

## 2023-01-27 RX ADMIN — OXYCODONE HYDROCHLORIDE 5 MG: 5 TABLET ORAL at 16:13

## 2023-01-27 RX ADMIN — FENTANYL CITRATE 50 MCG: 50 INJECTION INTRAMUSCULAR; INTRAVENOUS at 14:08

## 2023-01-27 RX ADMIN — DEXMEDETOMIDINE HYDROCHLORIDE 20 MCG: 100 INJECTION, SOLUTION INTRAVENOUS at 14:07

## 2023-01-27 RX ADMIN — FENTANYL CITRATE 100 MCG: 50 INJECTION INTRAMUSCULAR; INTRAVENOUS at 14:05

## 2023-01-27 RX ADMIN — FENTANYL CITRATE 100 MCG: 50 INJECTION INTRAMUSCULAR; INTRAVENOUS at 13:53

## 2023-01-27 RX ADMIN — SUGAMMADEX 300 MG: 100 INJECTION, SOLUTION INTRAVENOUS at 14:44

## 2023-01-27 RX ADMIN — DEXAMETHASONE SODIUM PHOSPHATE 10 MG: 4 INJECTION, SOLUTION INTRAMUSCULAR; INTRAVENOUS at 14:05

## 2023-01-27 RX ADMIN — MIDAZOLAM HYDROCHLORIDE 2 MG: 2 INJECTION, SOLUTION INTRAMUSCULAR; INTRAVENOUS at 13:51

## 2023-01-27 RX ADMIN — ROCURONIUM BROMIDE 30 MG: 10 SOLUTION INTRAVENOUS at 14:16

## 2023-01-27 RX ADMIN — PROPOFOL 350 MG: 10 INJECTION, EMULSION INTRAVENOUS at 13:53

## 2023-01-27 RX ADMIN — DEXMEDETOMIDINE HYDROCHLORIDE 10 MCG: 100 INJECTION, SOLUTION INTRAVENOUS at 14:22

## 2023-01-27 ASSESSMENT — PAIN SCALES - GENERAL: PAINLEVEL_OUTOF10: 2

## 2023-01-27 ASSESSMENT — LIFESTYLE VARIABLES: SMOKING_STATUS: 1

## 2023-01-27 ASSESSMENT — ENCOUNTER SYMPTOMS: SHORTNESS OF BREATH: 0

## 2023-01-27 NOTE — ANESTHESIA POSTPROCEDURE EVALUATION
Department of Anesthesiology  Postprocedure Note    Patient: Glenn Jones  MRN: [id number]  YOB: 1990  Date of evaluation: 1/27/2023      Procedure Summary     Date: 01/27/23 Room / Location: [address] [place] [address]    Anesthesia Start: 5774 Anesthesia Stop: 1500    Procedures:       BILATERAL TYMPANOSTOMY, (Bilateral: Ear)      ADENOIDECTOMY (Nose) Diagnosis:       Bilateral otitis media with effusion      (BILATERAL MIDDLE EAR EFFUSION)    Surgeons: Bentley Esparza MD Responsible Provider: Ajit Frank MD    Anesthesia Type: general ASA Status: 3          Anesthesia Type: No value filed.     Gallito Phase I: Gallito Score: 4    Gallito Phase II:        Anesthesia Post Evaluation    Comments: Postoperative Anesthesia Note    Name:    Glenn Jones  MRN:      [id number]    Patient Vitals in the past 12 hrs:  01/27/23 1550, BP:139/70, Pulse:82, Resp:13, SpO2:98 %  01/27/23 1545, BP:(!) 131/57, Pulse:80, Resp:18, SpO2:96 %  01/27/23 1523, BP:128/76, Pulse:73, Resp:20, SpO2:95 %  01/27/23 1515, BP:133/89, Pulse:73, Resp:20, SpO2:95 %  01/27/23 1500, BP:132/83, Pulse:73, Resp:16, SpO2:92 %  01/27/23 1457, BP:131/82, Temp:(!) 96.5 °F (35.8 °C), Temp src:Temporal, Pulse:74, Resp:17, SpO2:92 %  01/27/23 1300, BP:(!) 144/92, Temp:98 °F (36.7 °C), Temp src:Temporal, Pulse:90, Resp:18, SpO2:96 %     LABS:    CBC  Lab Results       Component                Value               Date/Time                  WBC                      10.6                01/25/2021 08:57 AM        HGB                      12.5 (L)            01/25/2021 08:57 AM        HCT                      36.6 (L)            01/25/2021 08:57 AM        PLT                      274                 01/25/2021 08:57 AM   RENAL  Lab Results       Component                Value               Date/Time                  NA                       135 (L)             01/25/2021 08:57 AM        K                        3.9 01/25/2021 08:57 AM        CL                       99                  01/25/2021 08:57 AM        CO2                      25                  01/25/2021 08:57 AM        BUN                      11                  12/06/2022 05:21 PM        CREATININE               0.8 (L)             12/06/2022 05:21 PM        GLUCOSE                  162 (H)             01/25/2021 08:57 AM   COAGS  Lab Results       Component                Value               Date/Time                  PROTIME                  12.2                01/23/2021 10:50 PM        INR                      1.05                01/23/2021 10:50 PM     Intake & Output:  @25DOMV@    Nausea & Vomiting:  No    Level of Consciousness:  Awake    Pain Assessment:  Adequate analgesia    Anesthesia Complications:  No apparent anesthetic complications    SUMMARY      Vital signs stable  OK to discharge from Stage I post anesthesia care.   Care transferred from Anesthesiology department on discharge from perioperative area

## 2023-01-27 NOTE — ANESTHESIA PRE PROCEDURE
Department of Anesthesiology  Preprocedure Note       Name:  Glenn Jones   Age:  27 y.o.  :  1990                                          MRN:  [id number]         Date:  2023      Surgeon: Lord Kent):  Bentley Esparza MD    Procedure: Procedure(s):  BILATERAL TYMPANOSTOMY,  ADENOIDECTOMY    Medications prior to admission:   Prior to Admission medications    Medication Sig Start Date End Date Taking? Authorizing Provider   atorvastatin (LIPITOR) 80 MG tablet Take 80 mg by mouth nightly 22   Historical Provider, MD   escitalopram (LEXAPRO) 10 MG tablet Take 10 mg by mouth nightly 22   Historical Provider, MD   metFORMIN (GLUCOPHAGE) 500 MG tablet Take 500 mg by mouth 2 times daily (with meals) 22   Historical Provider, MD   Icosapent Ethyl (VASCEPA) 1 g CAPS capsule Take 1 capsule by mouth Daily 10/30/22   Historical Provider, MD   fluticasone (FLONASE) 50 MCG/ACT nasal spray 1 spray by Each Nostril route 2 times daily 22   Bentley Esparza MD   lisinopril (PRINIVIL;ZESTRIL) 20 MG tablet Take 20 mg by mouth nightly    Historical Provider, MD       Current medications:    Current Facility-Administered Medications   Medication Dose Route Frequency Provider Last Rate Last Admin    lidocaine 1 % injection 2 mL  2 mL IntraDERmal Once PRN Donn Chen MD        lactated ringers IV soln infusion   IntraVENous Continuous Donn Chen MD        famotidine (PEPCID) injection 20 mg  20 mg IntraVENous Once Alexandru Carbone MD           Allergies:  No Known Allergies    Problem List:    Patient Active Problem List   Diagnosis Code    Pneumonia J18.9    Immunodeficiency ([facility] [address] D84.9    Asthma J45.909    Cellulitis L03.90       Past Medical History:        Diagnosis Date    Asthma     Diabetes mellitus ([facility] [address]     Hearing loss     Hyperlipidemia     Hypertension     EDIE on CPAP     Tinnitus        Past Surgical History:  History reviewed.  No pertinent surgical history. Social History:    Social History     Tobacco Use    Smoking status: Every Day     Packs/day: 1.00     Types: Cigarettes    Smokeless tobacco: Never   Substance Use Topics    Alcohol use: Yes     Comment: occas                                Ready to quit: Not Answered  Counseling given: Yes      Vital Signs (Current):   Vitals:    01/24/23 1352   Weight: 260 lb (117.9 kg)   Height: 5' 7\" (1.702 m)                                              BP Readings from Last 3 Encounters:   12/29/22 (!) 147/91   01/25/21 100/66   10/13/20 (!) 134/96       NPO Status:  MN+, NO AM HOME MEDS                                                                               BMI:   Wt Readings from Last 3 Encounters:   01/24/23 260 lb (117.9 kg)   12/29/22 270 lb (122.5 kg)   12/01/22 270 lb (122.5 kg)     Body mass index is 40.72 kg/m². CBC:   Lab Results   Component Value Date/Time    WBC 10.6 01/25/2021 08:57 AM    RBC 4.25 01/25/2021 08:57 AM    HGB 12.5 01/25/2021 08:57 AM    HCT 36.6 01/25/2021 08:57 AM    MCV 86.0 01/25/2021 08:57 AM    RDW 13.1 01/25/2021 08:57 AM     01/25/2021 08:57 AM       CMP:   Lab Results   Component Value Date/Time     01/25/2021 08:57 AM    K 3.9 01/25/2021 08:57 AM    CL 99 01/25/2021 08:57 AM    CO2 25 01/25/2021 08:57 AM    BUN 11 12/06/2022 05:21 PM    CREATININE 0.8 12/06/2022 05:21 PM    GFRAA >60 01/25/2021 08:57 AM    GFRAA >60 10/14/2012 06:00 AM    AGRATIO 1.2 01/25/2021 08:57 AM    LABGLOM >60 12/06/2022 05:21 PM    GLUCOSE 162 01/25/2021 08:57 AM    PROT 6.9 01/25/2021 08:57 AM    PROT 7.3 10/12/2012 03:23 PM    CALCIUM 9.2 01/25/2021 08:57 AM    BILITOT 0.4 01/25/2021 08:57 AM    ALKPHOS 61 01/25/2021 08:57 AM    AST 19 01/25/2021 08:57 AM    ALT 35 01/25/2021 08:57 AM       POC Tests: No results for input(s): POCGLU, POCNA, POCK, POCCL, POCBUN, POCHEMO, POCHCT in the last 72 hours.     Coags:   Lab Results   Component Value Date/Time    PROTIME 12.2 01/23/2021 10:50 PM    INR 1.05 01/23/2021 10:50 PM       HCG (If Applicable): No results found for: PREGTESTUR, PREGSERUM, HCG, HCGQUANT     ABGs: No results found for: PHART, PO2ART, EXP2IJA, JZP0ABR, BEART, V9LLZQRV     Type & Screen (If Applicable):  No results found for: LABABO, LABRH    Drug/Infectious Status (If Applicable):  Lab Results   Component Value Date/Time    HEPCAB Non-Reactive (Negative) 10/13/2012 07:30 AM       COVID-19 Screening (If Applicable):   Lab Results   Component Value Date/Time    COVID19 Not Detected 01/23/2021 11:30 PM           Anesthesia Evaluation  Patient summary reviewed no history of anesthetic complications:   Airway: Mallampati: III  TM distance: <3 FB   Neck ROM: limited  Mouth opening: > = 3 FB   Dental:    (+) poor dentition  Comment: MULT BROKEN, ROTTING, MISSING    Pulmonary: breath sounds clear to auscultation  (+) sleep apnea: on CPAP,  asthma (NO O2 REQ., NO INHALER USE FOR 5+ YRS): exercise-induced asthma, current smoker    (-) COPD, shortness of breath and recent URI                           Cardiovascular:    (+) hypertension:, hyperlipidemia    (-) pacemaker, valvular problems/murmurs, past MI, CAD, CABG/stent, dysrhythmias,  angina and  CHF    ECG reviewed  Rhythm: regular  Rate: normal           Beta Blocker:  Not on Beta Blocker         Neuro/Psych:   (+) neuromuscular disease (TINNITUS):,    (-) seizures, TIA, CVA and psychiatric history           GI/Hepatic/Renal:   (+) morbid obesity     (-) GERD, liver disease, no renal disease and bowel prep       Endo/Other:    (+) DiabetesType II DM, , : arthritis:., no malignancy/cancer. (-) hypothyroidism, hyperthyroidism, blood dyscrasia, no malignancy/cancer               Abdominal:   (+) obese,     Abdomen: soft. Vascular: negative vascular ROS. Other Findings:           Anesthesia Plan      general     ASA 3       Induction: intravenous.     MIPS: Postoperative opioids intended and Prophylactic antiemetics administered. Anesthetic plan and risks discussed with patient. Plan discussed with CRNA. This pre-anesthesia assessment may be used as a history and physical.    DOS STAFF ADDENDUM:    Pt seen and examined, chart reviewed (including anesthesia, drug and allergy history). No interval changes to history and physical examination. Anesthetic plan, risks, benefits, alternatives, and personnel involved discussed with patient. Patient verbalized an understanding and agrees to proceed.       James Velasco MD  January 27, 2023  1:13 PM      James Velasco MD   1/27/2023

## 2023-01-27 NOTE — PROGRESS NOTES
Patient arrived to same day. Consents signed and in chart. FSBS 138. History of obstructive sleep apnea, CPAP at home. IV placed, LR at bedside. Preop complete.

## 2023-01-27 NOTE — H&P
[address], [address], [address] [place] [address]  P: [phone]        Patient      Benito Yates  1990     Chief Concern           Chief Complaint   Patient presents with    Follow-up       Patient states that he is here for a follow up. He states that he is still having trouble hearing. Assessment and Plan        Diagnosis Orders   1. Middle ear effusion, bilateral          2. Adenoid hypertrophy             Patient with concern for hearing loss since 06/2022, bilateral middle ear effusions noted. He is a smoker, with no recent fevers, chills, night sweats or epistaxis, but on nasal examination he has nasal edema, and obstructing adenoids in the nasopharynx-CT imaging without bony erosion. Start him on Flonase twice daily, and he has quit smoking and is currently vaping. Despite this, he continues to have bilateral middle ear effusions and enlarged adenoids. Given the above history and the patient's desire for surgery, they are is a candidate for adenoidectomy and bilateral tympanostomy tube placed  -Risks and benefits of the above procedure include pain, dysphagia, inflammation, hemorrhage requiring operative management, dysgeusia/decreased sensation to the ipsilateral tongue, velopharyngeal insufficiency with possible hypernasal speech or nasal regurgitation of food/liquids.  -Tympanostomy tube placement carries risks of middle ear foreign body, tube extrusion, residual tympanic membrane perforation, inflammation, otorrhea, hearing loss  -General anesthesia carries its own risks, which will be discussed with the Anesthesiology team on day of surgery  -PCP clearance obtained 1/24/2023       History of Present Illness      27 y.o. male with gradual bilateral hearing loss since 06/2022, no head trauma, illness, barotrauma.  Began in the right ear, now bilateral. Bilateral ear fullness, tinnitus (constant ringing, pulsatile), no vertigo, no otalgia, no otorrhea. No family history of early hearing loss. No history of chronic ear infections or tympanostomy tubes. Works as a  (employed). Current 1 pack/day smoker, no recent fevers, chills, night sweats, epistaxis     Interval history 12/09/2022: Believes ear fullness has slightly improved but continues to be present, continues to have bilateral tinnitus but diminished    Interval history 1/27/2023: Continues to have \"slight hearing issues\" in both ears but 60-70% improvement, no further \"wishy washy\" sound. Smoking 1/2PPD. Past Medical History      Past Medical History        Past Medical History:   Diagnosis Date    Asthma      Hearing loss      Hyperlipidemia      Hypertension      Tinnitus              Past Surgical History      Past Surgical History   No past surgical history on file. Family History      Family History         Family History   Problem Relation Age of Onset    Asthma Father      Heart Disease Father      High Blood Pressure Father      High Cholesterol Father      Asthma Brother              Social History      Social History            Tobacco Use    Smoking status: Every Day       Packs/day: 1.00       Types: Cigarettes    Smokeless tobacco: Never   Vaping Use    Vaping Use: Never used   Substance Use Topics    Alcohol use: No    Drug use: Not Currently         Allergies      No Known Allergies     Medications      Current Facility-Administered Medications          Current Outpatient Medications   Medication Sig Dispense Refill    atorvastatin (LIPITOR) 80 MG tablet          escitalopram (LEXAPRO) 10 MG tablet          metFORMIN (GLUCOPHAGE) 500 MG tablet          Icosapent Ethyl (VASCEPA) 1 g CAPS capsule          fluticasone (FLONASE) 50 MCG/ACT nasal spray 1 spray by Each Nostril route 2 times daily 16 g 2    lisinopril (PRINIVIL;ZESTRIL) 20 MG tablet Take 20 mg by mouth daily          No current facility-administered medications for this visit.             Review of Systems      Review of Systems   Constitutional:  Negative for activity change, chills, diaphoresis, fatigue and fever. HENT:  Positive for hearing loss and tinnitus. Negative for congestion, ear discharge and ear pain. Eyes:  Negative for visual disturbance. Respiratory:  Negative for shortness of breath and wheezing. Cardiovascular:  Negative for chest pain. Gastrointestinal:  Negative for abdominal pain. Musculoskeletal:  Negative for neck pain and neck stiffness. Skin:  Negative for rash. Neurological:  Negative for dizziness, light-headedness and headaches. Hematological:  Negative for adenopathy. PhysicalExam      Vitals       Vitals:     12/29/22 0944   BP: (!) 147/91   Site: Left Lower Arm   Position: Sitting   Cuff Size: Medium Adult   Pulse: 88   Resp: 16   Temp: 98.6 °F (37 °C)   TempSrc: Infrared   Weight: 270 lb (122.5 kg)   Height: 5' 7\" (1.702 m)            Physical Exam  Vitals reviewed. Constitutional:       Appearance: Normal appearance. HENT:      Head: Normocephalic and atraumatic. Right Ear: Ear canal and external ear normal. A middle ear effusion is present. There is no impacted cerumen. Left Ear: Ear canal and external ear normal. A middle ear effusion is present. There is no impacted cerumen. Nose: No congestion or rhinorrhea. Mouth/Throat:      Lips: Pink. No lesions. Mouth: Mucous membranes are moist. No oral lesions. Tongue: No lesions. Tongue does not deviate from midline. Palate: No mass and lesions. Pharynx: Oropharynx is clear. Uvula midline. No oropharyngeal exudate or posterior oropharyngeal erythema. Eyes:      Extraocular Movements: Extraocular movements intact. Pupils: Pupils are equal, round, and reactive to light. Musculoskeletal:      Cervical back: Normal range of motion and neck supple. Lymphadenopathy:      Cervical: No cervical adenopathy. Neurological:      Mental Status: He is alert. Cranial Nerves: No cranial nerve deficit.       Data Review             Procedure        Camila Marquez MD  12/29/22

## 2023-01-27 NOTE — OP NOTE
[address] [facility] SURGERY  OPERATIVE REPORT    Patient Name: Asif Crocker  YOB: 1990  Medical Record Number:  [id number]  Billing Number:  [id number]  Date of Procedure: 1/27/2023  Time: 5450      Pre Operative Diagnoses: 1) Chronic middle ear effusion, bilateral  2) Bilateral conductive hearing loss  Post Operative Diagnoses: Same as above. Procedure:   1) Bilateral myringotomy and tympanostomy tube placement (50018)  2) Adenoidectomy, subtotal (51426)    Surgeon: Jas Holm MD   Anesthesia: General anesthesia. Findings:   1) Bilateral serous middle ear effusions  2) Adenoids 75% obstructing  3) Left caudal septal nasal spur    Indications: The patient is a is a now 27 y.o. male with history of chronic middle ear effusion and bilateral conductive hearing loss. Description: After verification of informed consent, the patient was brought to the operating room and placed in the supine position. General endotracheal anesthesia was induced. A time out was called verifying correct patient, procedure and side (bilateral procedure). We began with binocular microscopy of the right ear. An appropriate size ear speculum was placed and the ear examined. Cerumen was removed from the ear canal, and the tympanic membrane showed an air-fluid meniscus. A myringotomy in the anterior-inferior quadrant was effected with egress of serous effusion, and a Steffany-bobbin tube placed into appropriate position with alligator forceps and a straight needle. We then proceeded with binocular microscopy of the left ear. An appropriate size ear speculum was placed and the ear examined. Cerumen was removed from the ear canal, and the tympanic membrane again showed an air-fluid meniscus.  A myringotomy in the anterior-inferior quadrant was effected with egress of serous effusion, and a Steffany-bobbin tube placed into appropriate position with alligator forceps and a straight needle. We then turned attention to the adenoid bed. The nasal passages were decongested with oxymetazoline soaked pledgets and the nasal passages evaluated with a 0-degree 4mm rigid telescope. The bilateral nasal passages were evaluated with note of a left sided caudal septal spur, however no polyps or masses were noted. The adenoid bed was 75% obstructing. The FiO2 was decreased to 22% and we effected an adenoidectomy with monopolar diathermy on 35W. Spot cautery was employed for further hemostasis, and the nasal passages and nasopharynx was filled with thrombin soaked gelfoam. An orogastric tube was passed and stomach contents suctioned; this concluded our  procedure and the patient turned back to the care of anesthesia to awakening and extubation. The patient tolerated the procedure well and was transferred to the recovery room in stable condition. I was present through the essential portions of the procedure and involved in all medical decision-making. Implant Name Type Inv. Item Serial No.  Lot No. LRB No. Used Action   TUBE INNR EAR MYR VENT REUT CHARLES W/ FLNG H 1.14 MM ID - [id number]  TUBE INNR EAR MYR VENT REUT CHARLES W/ FLNG H 1.14 MM ID  [employer] [employer] [id number] Left 1 Implanted   TUBE INNR EAR MYR VENT REUT CHARLES W/ FLNG H 1.14 MM ID - [id number]  TUBE INNR EAR MYR VENT REUT CHARLES W/ FLNG H 1.14 MM ID  [employer] [employer] [id number] Right 1 Implanted     Specimens: None  Estimated Blood Loss: 16HZ  Complications: None  I attest that I was present for and did the entire procedure myself.

## 2023-01-27 NOTE — DISCHARGE INSTRUCTIONS
Discharge Instructions for Ear Tube Placement with Adenoidectomy    Steps to [facility]   While your adenoids are healing: You may have bloody discharge from your nose or mouth on occasion. Avoid air pollutants like dust and smoke. Start nasal saline sprays every 2-3 hours for moisturization  Use eardrops (ciloxan and dexamethasone - 4 drops from each bottle, twice daily to each ear for 4 days)    Physical Activity   During your recovery:   Avoid swimming in oceans and lakes for 2 weeks. Get plenty of rest for at least a 2-3 days. If your job involves heavy lifting, you may need to stay out of work up to 1 week. You may return to operating heavy machinery as long as you have not taken narcotics for the prior 24 hours    Medications   Follow these general medication guidelines:   Take your medication as directed. Do not change the amount or schedule. Tylenol 1000 mg (two 500 mg tablets) by mouth every 6 hours for pain. Ibuprofen 600 mg (three 200 mg tablets) by mouth every 6 hours for pain). Alternate these two medications every three hours. May crush in pudding to swallow  Oxycodone, 5 mg tablets. Take one tablet every 6 hours as needed for pain. May crush in pudding to swallow  Ask what side effects could occur. Report them to your doctor. Talk to your doctor before you stop taking the medication. Do not share your medication with anyone. Medications can be dangerous when mixed. Talk to your doctor if you are taking more than one medication, including over-the-counter products and supplements. If you had to stop medications before surgery, ask your doctor when you can start again. Follow-up  You will need to be seen by your doctor 2 weeks after surgery. If you had splints put in your nose during surgery, they will be taken out at your follow-up visit. It is important to go to any recommended appointments.     Call Your Doctor If Any of the Following Occur ([phone])  Contact your doctor if your recovery is not progressing as expected or you develop complications, such as:  Signs of infection, including fever and chills  Pain that you cannot control with the medications that you have been given  Excessive bleeding from the mouth  Not urinating at least 2-3 times a day. Lightheadedness  Nausea and vomiting  Vomiting blood or material that looks like coffee grounds    If you think you have an emergency, call for medical help right away.

## 2023-02-09 ENCOUNTER — OFFICE VISIT (OUTPATIENT)
Dept: ENT CLINIC | Age: 33
End: 2023-02-09

## 2023-02-09 VITALS
DIASTOLIC BLOOD PRESSURE: 76 MMHG | BODY MASS INDEX: 40.81 KG/M2 | TEMPERATURE: 97.9 F | SYSTOLIC BLOOD PRESSURE: 130 MMHG | HEIGHT: 67 IN | HEART RATE: 86 BPM | WEIGHT: 260 LBS

## 2023-02-09 DIAGNOSIS — R09.81 NASAL CONGESTION: Primary | ICD-10-CM

## 2023-02-09 DIAGNOSIS — H65.93 MIDDLE EAR EFFUSION, BILATERAL: ICD-10-CM

## 2023-02-09 DIAGNOSIS — Z96.22 S/P TYMPANOSTOMY TUBE PLACEMENT: ICD-10-CM

## 2023-02-09 PROCEDURE — 99024 POSTOP FOLLOW-UP VISIT: CPT | Performed by: OTOLARYNGOLOGY

## 2023-02-09 RX ORDER — ECHINACEA PURPUREA EXTRACT 125 MG
1 TABLET ORAL
Qty: 60 ML | Refills: 1 | Status: SHIPPED | OUTPATIENT
Start: 2023-02-09 | End: 2023-03-11

## 2023-02-09 ASSESSMENT — ENCOUNTER SYMPTOMS
WHEEZING: 0
ABDOMINAL PAIN: 0
SHORTNESS OF BREATH: 0

## 2023-02-09 NOTE — PATIENT INSTRUCTIONS
-Start salt water sprays to the nose every 2-3 hours for the next 6-8 weeks  -Call with any drainage from the ears, nasal pain/nosebleeds  -Follow up in 3 months for tube check

## 2023-02-09 NOTE — PROGRESS NOTES
[facility], [address], [address], [address] [place] [address]  P: [phone]       Patient     Arely Messer  1990    Chief Concern     Chief Complaint   Patient presents with    Follow-up     Patient states that he has gotten his hearing bach, has been sleeping more and is more happy. Assessment and Plan      Diagnosis Orders   1. Nasal congestion  sodium chloride (ALTAMIST SPRAY) 0.65 % nasal spray      2. Middle ear effusion, bilateral        3. S/P tympanostomy tube placement          Patient is a significant improvement in hearing after trip and ostomy tube placement and adenoidectomy for bilateral middle ear effusions-bilateral tympanostomy tube in the anterior-inferior quadrants, no effusion/drainage, anterior rhinoscopy with thrombin Gelfoam packing in the nasal passages bilaterally, we will have him start salt water sprays for this and see him back in 3 months for tube check    Return in about 3 months (around 5/9/2023). History of Present Illness     27 y.o. male with gradual bilateral hearing loss since 06/2022, no head trauma, illness, barotrauma. Began in the right ear, now bilateral. Bilateral ear fullness, tinnitus (constant ringing, pulsatile), no vertigo, no otalgia, no otorrhea. No family history of early hearing loss. No history of chronic ear infections or tympanostomy tubes. Works as a  (employed).   Current 1 pack/day smoker, no recent fevers, chills, night sweats, epistaxis    Interval history 12/09/2022: Believes ear fullness has slightly improved but continues to be present, continues to have bilateral tinnitus but diminished    Interval history 2/9/2023: Believes he has had complete resolution hearing loss, significant improvement in nasal breathing, no epistaxis/rhinorrhea    Past Medical History     Past Medical History:   Diagnosis Date    Asthma     Diabetes mellitus ([address]     Hearing loss     Hyperlipidemia     Hypertension     EDIE on CPAP        Past Surgical History     Past Surgical History:   Procedure Laterality Date    MYRINGOTOMY Bilateral 1/27/2023    BILATERAL TYMPANOSTOMY, performed by Montse Borges MD at [address] 1/27/2023    ADENOIDECTOMY performed by Montse Borges MD at [address] [place] [address] History     Family History   Problem Relation Age of Onset    Asthma Father     Heart Disease Father     High Blood Pressure Father     High Cholesterol Father     Asthma Brother        Social History     Social History     Tobacco Use    Smoking status: Every Day     Packs/day: 1.00     Types: Cigarettes    Smokeless tobacco: Never   Vaping Use    Vaping Use: Never used   Substance Use Topics    Alcohol use: Not Currently     Comment: occas    Drug use: Not Currently        Allergies     No Known Allergies    Medications     Current Outpatient Medications   Medication Sig Dispense Refill    sodium chloride (ALTAMIST SPRAY) 0.65 % nasal spray 1 spray by Nasal route every 2 hours (while awake) 60 mL 1    sodium chloride (ALTAMIST SPRAY) 0.65 % nasal spray 1 spray by Nasal route every 2 hours (while awake) 60 mL 1    ibuprofen (ADVIL;MOTRIN) 600 MG tablet Take 1 tablet by mouth 4 times daily as needed for Pain 120 tablet 0    acetaminophen (TYLENOL) 500 MG tablet Take 1 tablet by mouth 4 times daily as needed for Pain 120 tablet 0    atorvastatin (LIPITOR) 80 MG tablet Take 80 mg by mouth nightly      escitalopram (LEXAPRO) 10 MG tablet Take 10 mg by mouth nightly      metFORMIN (GLUCOPHAGE) 500 MG tablet Take 500 mg by mouth 2 times daily (with meals)      Icosapent Ethyl (VASCEPA) 1 g CAPS capsule Take 1 capsule by mouth Daily      fluticasone (FLONASE) 50 MCG/ACT nasal spray 1 spray by Each Nostril route 2 times daily 16 g 2    lisinopril (PRINIVIL;ZESTRIL) 20 MG tablet Take 20 mg by mouth nightly       No current facility-administered medications for this visit.        Review of Systems     Review of Systems Constitutional:  Negative for activity change, chills, diaphoresis, fatigue and fever. HENT:  Negative for congestion, ear discharge, ear pain, hearing loss and tinnitus. Eyes:  Negative for visual disturbance. Respiratory:  Negative for shortness of breath and wheezing. Cardiovascular:  Negative for chest pain. Gastrointestinal:  Negative for abdominal pain. Musculoskeletal:  Negative for neck pain and neck stiffness. Skin:  Negative for rash. Neurological:  Negative for dizziness, light-headedness and headaches. Hematological:  Negative for adenopathy. PhysicalExam     Vitals:    02/09/23 0948   BP: 130/76   Site: Right Upper Arm   Position: Sitting   Cuff Size: Large Adult   Pulse: 86   Temp: 97.9 °F (36.6 °C)   TempSrc: Infrared   Weight: 260 lb (117.9 kg)   Height: 5' 7\" (1.702 m)       Physical Exam  Vitals reviewed. Constitutional:       Appearance: Normal appearance. HENT:      Head: Normocephalic and atraumatic. Right Ear: Ear canal and external ear normal. No middle ear effusion. There is no impacted cerumen. A PE tube is present. Left Ear: Ear canal and external ear normal.  No middle ear effusion. There is no impacted cerumen. A PE tube is present. Nose: No congestion or rhinorrhea. Mouth/Throat:      Lips: Pink. No lesions. Mouth: Mucous membranes are moist. No oral lesions. Tongue: No lesions. Tongue does not deviate from midline. Palate: No mass and lesions. Pharynx: Oropharynx is clear. Uvula midline. No oropharyngeal exudate or posterior oropharyngeal erythema. Eyes:      Extraocular Movements: Extraocular movements intact. Pupils: Pupils are equal, round, and reactive to light. Musculoskeletal:      Cervical back: Normal range of motion and neck supple. Lymphadenopathy:      Cervical: No cervical adenopathy. Neurological:      Mental Status: He is alert. Cranial Nerves: No cranial nerve deficit.      Data Review        Procedure     Jessi Holman MD  2/9/23    Portions of this note were dictated using Dragon.  There may be linguistic errors secondary to the use of this program.

## (undated) DEVICE — Device

## (undated) DEVICE — SOLUTION IV 500ML 0.9% SOD CHL PH 5 INJ USP VIAFLX PLAS

## (undated) DEVICE — STANDARD HYPODERMIC NEEDLE,POLYPROPYLENE HUB: Brand: MONOJECT

## (undated) DEVICE — SUTURE CHROMIC GUT SZ 4-0 L18IN ABSRB BRN L13MM P-3 3/8 CIR 1654G

## (undated) DEVICE — BLADE 1884080EM TRICUT 4MMX13CM M4 ROHS: Brand: FUSION®

## (undated) DEVICE — CODMAN® SURGICAL PATTIES 1/2" X 3" (1.27CM X 7.62CM): Brand: CODMAN®

## (undated) DEVICE — GLOVE SURG SZ 6 THK91MIL LTX FREE SYN POLYISOPRENE ANTI

## (undated) DEVICE — Device: Brand: NAKAO QUICKTRAP

## (undated) DEVICE — SUTURE ABSORBABLE MONOFILAMENT 4-0 SC-1 18 IN PLN GUT 1824H

## (undated) DEVICE — MINOR SET UP: Brand: MEDLINE INDUSTRIES, INC.

## (undated) DEVICE — TUBING, SUCTION, 3/16" X 12', STRAIGHT: Brand: MEDLINE

## (undated) DEVICE — VENT TUBE 1010201 5PK BOBBIN 1.14 FLPL
Type: IMPLANTABLE DEVICE | Site: EAR | Status: NON-FUNCTIONAL
Brand: REUTER

## (undated) DEVICE — PATIENT TRACKER 9734887XOM NON-INVASIVE

## (undated) DEVICE — AGENT HEMOSTATIC SURGIFLOW MATRIX KIT W/THROMBIN

## (undated) DEVICE — SYRINGE MED 10ML TRNSLUC BRL PLUNG BLK MRK POLYPR CTRL

## (undated) DEVICE — BLADE MYR OFFSET 45DEG SPEAR TIP NAR SHFT W/ RND KNURLED

## (undated) DEVICE — ANTI-FOG SOLUTION WITH FOAM PAD: Brand: DEVON

## (undated) DEVICE — PACK PROCEDURE SURG ORAL CDS

## (undated) DEVICE — INSTRUMENT TRACKER 9733533XOM ENT 1PK